# Patient Record
Sex: MALE | Race: WHITE | Employment: OTHER | URBAN - METROPOLITAN AREA
[De-identification: names, ages, dates, MRNs, and addresses within clinical notes are randomized per-mention and may not be internally consistent; named-entity substitution may affect disease eponyms.]

---

## 2023-02-08 ENCOUNTER — HOSPITAL ENCOUNTER (OUTPATIENT)
Dept: CARDIAC CATH/INVASIVE PROCEDURES | Age: 83
Discharge: HOME | End: 2023-02-08
Payer: COMMERCIAL

## 2023-02-08 VITALS — HEIGHT: 72 IN | BODY MASS INDEX: 25.33 KG/M2 | WEIGHT: 187 LBS

## 2023-02-08 PROCEDURE — 93660 TILT TABLE EVALUATION: CPT

## 2023-02-08 NOTE — PROCEDURES
[address]                  [facility]                                TILT TABLE TEST    PATIENT NAME: Zahida Quick                 :        1940  MED REC NO:   [id number]                             ROOM:  ACCOUNT NO:   [de-identified]                           ADMIT DATE: 2023  PROVIDER:     Gi Feliz MD      Cardiovascular Diagnostics Department       PROCEDURE:  Tilt table test.    PCP:  Dr. Rodo Chin. HISTORY AND INDICATIONS:  This is an 81-year-old gentleman with history  of hypertension and preserved ventricular function with no coronary  disease on cardiac catheterization over the past year. He does have  known moderate-to-severe aortic insufficiency with preserved ventricular  function. He has had problems with somewhat labile blood pressures as  well as periods of positional lightheadedness and dizziness. He is  scheduled for ENT evaluation and at this point comes for tilt table  testing. PROCEDURE:  After informed consent was obtained, the patient was brought  to the electrophysiology area in the fasting, unsedated state. He was  placed on the tilt table, connected to the continuous cycling blood  pressure cuff monitor and to the continuous heart rate monitor. After  baseline supine measurements were obtained, the table was placed upright  to 80 degrees for a period of 20 minutes, at which point right and left  carotid sinus massage were performed with the table remaining upright. At this point, 0.4 mg sublingual nitroglycerin was given and the table  was kept upright for an additional 10 minutes. At the end of the case,  the table was placed supine. Testing was completed. Findings were  discussed in detail with the patient and his wife. Instruction and  prescription were given and he was discharged from the area in good  condition. FINDINGS:  1.   At 0 degree supine, blood pressure was 154/69, heart rate was 68 in  atrial fibrillation. 2.  At 2 minutes upright, blood pressure was 150/71, heart rate was 58. The patient was not symptomatic. 3.  At 20 minutes upright, blood pressure was 154/64, heart rate was 50  in atrial fibrillation. The patient was not symptomatic. 4.  With right carotid sinus massage upright at 20 minutes, blood  pressure fell to 127/77 mmHg, heart rate was stable at 54 beats per  minute with no pauses seen. 5.  With left carotid sinus massage upright at 20 minutes, blood  pressure was 135/66, heart rate was 52. The patient was mildly  lightheaded. There were no pauses. 6.  0.4 mg sublingual nitroglycerin was given at 20 minutes upright. 7.  At 24 minutes upright, blood pressure fell to 122/68 mmHg, heart  rate was 63 beats per minute. The patient was mildly lightheaded. IMPRESSION:  1. Vasodepressor responses to carotid sinus massage as well as  nitroglycerin consistent with vasovagal syndrome. 2.  No bradycardia or pauses with carotid sinus massage. PLAN:  1. Continue good daily fluid intake using 32 ounces of Gatorade daily. 2.  Continue current antihypertensive regimen. 3.  Add fludrocortisone 0.1 mg every Monday, Wednesday, and Friday. 4.  Continue use of thigh-high support stockings daily. 5.  Plan for close followup in early March at [facility]. Jhon Crockett MD    D: 02/08/2023 12:08:04       T: 02/08/2023 12:10:40     [email or web address]  Job#: [id number]     Doc#: [id number]    CC:  MD Salma Roth.  Erick Hernandez

## 2025-01-10 ENCOUNTER — TELEPHONE (OUTPATIENT)
Dept: CARDIOLOGY CLINIC | Age: 85
End: 2025-01-10

## 2025-01-10 PROBLEM — I25.3 ATRIAL SEPTAL ANEURYSM: Status: ACTIVE | Noted: 2025-01-10

## 2025-01-10 NOTE — TELEPHONE ENCOUNTER
Dr. Katy Smith stated he would like to perform a R/L heart cath and a CHRIS on this pt.    Thank you.   Roseanna PARK CNP

## 2025-02-05 ENCOUNTER — HOSPITAL ENCOUNTER (INPATIENT)
Age: 85
LOS: 1 days | Discharge: HOME | End: 2025-02-06
Attending: INTERNAL MEDICINE | Admitting: INTERNAL MEDICINE
Payer: COMMERCIAL

## 2025-02-05 ENCOUNTER — APPOINTMENT (OUTPATIENT)
Age: 85
End: 2025-02-05
Attending: INTERNAL MEDICINE
Payer: COMMERCIAL

## 2025-02-05 DIAGNOSIS — I35.0 NONRHEUMATIC AORTIC VALVE STENOSIS: Primary | ICD-10-CM

## 2025-02-05 DIAGNOSIS — I35.0 AORTIC STENOSIS: ICD-10-CM

## 2025-02-05 LAB
BUN BLD-MCNC: 38 MG/DL (ref 8–26)
CHLORIDE BLD-SCNC: 106 MMOL/L (ref 98–107)
ECHO BSA: 1.98 M2
ECHO LV EF PHYSICIAN: 50 %
EGFR, POC: 75 ML/MIN/1.73M2
GLUCOSE BLD-MCNC: 96 MG/DL (ref 74–100)
HCT VFR BLD AUTO: 39 % (ref 41–53)
PLATELET # BLD AUTO: 210 K/UL (ref 138–453)
POC CREATININE: 1 MG/DL (ref 0.51–1.19)
POC HEMOGLOBIN (CALC): 13.2 G/DL (ref 13.5–17.5)
POTASSIUM BLD-SCNC: 4.6 MMOL/L (ref 3.5–4.5)
SODIUM BLD-SCNC: 142 MMOL/L (ref 138–146)

## 2025-02-05 PROCEDURE — C1892 INTRO/SHEATH,FIXED,PEEL-AWAY: HCPCS | Performed by: INTERNAL MEDICINE

## 2025-02-05 PROCEDURE — 99152 MOD SED SAME PHYS/QHP 5/>YRS: CPT | Performed by: INTERNAL MEDICINE

## 2025-02-05 PROCEDURE — 6370000000 HC RX 637 (ALT 250 FOR IP): Performed by: INTERNAL MEDICINE

## 2025-02-05 PROCEDURE — 93460 R&L HRT ART/VENTRICLE ANGIO: CPT | Performed by: INTERNAL MEDICINE

## 2025-02-05 PROCEDURE — C1760 CLOSURE DEV, VASC: HCPCS | Performed by: INTERNAL MEDICINE

## 2025-02-05 PROCEDURE — C1894 INTRO/SHEATH, NON-LASER: HCPCS | Performed by: INTERNAL MEDICINE

## 2025-02-05 PROCEDURE — 99153 MOD SED SAME PHYS/QHP EA: CPT | Performed by: INTERNAL MEDICINE

## 2025-02-05 PROCEDURE — 76937 US GUIDE VASCULAR ACCESS: CPT | Performed by: INTERNAL MEDICINE

## 2025-02-05 PROCEDURE — 93454 CORONARY ARTERY ANGIO S&I: CPT | Performed by: INTERNAL MEDICINE

## 2025-02-05 PROCEDURE — 4A023N8 MEASUREMENT OF CARDIAC SAMPLING AND PRESSURE, BILATERAL, PERCUTANEOUS APPROACH: ICD-10-PCS | Performed by: INTERNAL MEDICINE

## 2025-02-05 PROCEDURE — 84132 ASSAY OF SERUM POTASSIUM: CPT

## 2025-02-05 PROCEDURE — 84520 ASSAY OF UREA NITROGEN: CPT

## 2025-02-05 PROCEDURE — 93312 ECHO TRANSESOPHAGEAL: CPT | Performed by: INTERNAL MEDICINE

## 2025-02-05 PROCEDURE — 93451 RIGHT HEART CATH: CPT | Performed by: INTERNAL MEDICINE

## 2025-02-05 PROCEDURE — 82947 ASSAY GLUCOSE BLOOD QUANT: CPT

## 2025-02-05 PROCEDURE — 6370000000 HC RX 637 (ALT 250 FOR IP): Performed by: STUDENT IN AN ORGANIZED HEALTH CARE EDUCATION/TRAINING PROGRAM

## 2025-02-05 PROCEDURE — 84295 ASSAY OF SERUM SODIUM: CPT

## 2025-02-05 PROCEDURE — C1887 CATHETER, GUIDING: HCPCS | Performed by: INTERNAL MEDICINE

## 2025-02-05 PROCEDURE — 2500000003 HC RX 250 WO HCPCS: Performed by: STUDENT IN AN ORGANIZED HEALTH CARE EDUCATION/TRAINING PROGRAM

## 2025-02-05 PROCEDURE — B246ZZ4 ULTRASONOGRAPHY OF RIGHT AND LEFT HEART, TRANSESOPHAGEAL: ICD-10-PCS | Performed by: INTERNAL MEDICINE

## 2025-02-05 PROCEDURE — C1769 GUIDE WIRE: HCPCS | Performed by: INTERNAL MEDICINE

## 2025-02-05 PROCEDURE — 85049 AUTOMATED PLATELET COUNT: CPT

## 2025-02-05 PROCEDURE — 85014 HEMATOCRIT: CPT

## 2025-02-05 PROCEDURE — 82565 ASSAY OF CREATININE: CPT

## 2025-02-05 PROCEDURE — 2709999900 HC NON-CHARGEABLE SUPPLY: Performed by: INTERNAL MEDICINE

## 2025-02-05 PROCEDURE — C1725 CATH, TRANSLUMIN NON-LASER: HCPCS | Performed by: INTERNAL MEDICINE

## 2025-02-05 PROCEDURE — B2111ZZ FLUOROSCOPY OF MULTIPLE CORONARY ARTERIES USING LOW OSMOLAR CONTRAST: ICD-10-PCS | Performed by: INTERNAL MEDICINE

## 2025-02-05 PROCEDURE — 93312 ECHO TRANSESOPHAGEAL: CPT

## 2025-02-05 PROCEDURE — 82435 ASSAY OF BLOOD CHLORIDE: CPT

## 2025-02-05 PROCEDURE — 7100000010 HC PHASE II RECOVERY - FIRST 15 MIN: Performed by: INTERNAL MEDICINE

## 2025-02-05 PROCEDURE — 7100000011 HC PHASE II RECOVERY - ADDTL 15 MIN: Performed by: INTERNAL MEDICINE

## 2025-02-05 PROCEDURE — 6360000002 HC RX W HCPCS: Performed by: INTERNAL MEDICINE

## 2025-02-05 PROCEDURE — 2060000000 HC ICU INTERMEDIATE R&B

## 2025-02-05 PROCEDURE — 6360000004 HC RX CONTRAST MEDICATION: Performed by: INTERNAL MEDICINE

## 2025-02-05 PROCEDURE — 93325 DOPPLER ECHO COLOR FLOW MAPG: CPT | Performed by: INTERNAL MEDICINE

## 2025-02-05 RX ORDER — METOPROLOL SUCCINATE 25 MG/1
12.5 TABLET, EXTENDED RELEASE ORAL NIGHTLY
Status: DISCONTINUED | OUTPATIENT
Start: 2025-02-05 | End: 2025-02-06 | Stop reason: HOSPADM

## 2025-02-05 RX ORDER — TAMSULOSIN HYDROCHLORIDE 0.4 MG/1
0.4 CAPSULE ORAL DAILY
Status: DISCONTINUED | OUTPATIENT
Start: 2025-02-05 | End: 2025-02-06 | Stop reason: HOSPADM

## 2025-02-05 RX ORDER — POTASSIUM CHLORIDE 1500 MG/1
40 TABLET, EXTENDED RELEASE ORAL PRN
Status: DISCONTINUED | OUTPATIENT
Start: 2025-02-05 | End: 2025-02-06 | Stop reason: HOSPADM

## 2025-02-05 RX ORDER — OXYCODONE AND ACETAMINOPHEN 5; 325 MG/1; MG/1
1 TABLET ORAL ONCE
Status: COMPLETED | OUTPATIENT
Start: 2025-02-05 | End: 2025-02-05

## 2025-02-05 RX ORDER — SODIUM CHLORIDE 9 MG/ML
INJECTION, SOLUTION INTRAVENOUS PRN
Status: DISCONTINUED | OUTPATIENT
Start: 2025-02-05 | End: 2025-02-06 | Stop reason: HOSPADM

## 2025-02-05 RX ORDER — SODIUM CHLORIDE 0.9 % (FLUSH) 0.9 %
5-40 SYRINGE (ML) INJECTION EVERY 12 HOURS SCHEDULED
Status: DISCONTINUED | OUTPATIENT
Start: 2025-02-05 | End: 2025-02-06 | Stop reason: HOSPADM

## 2025-02-05 RX ORDER — POLYETHYLENE GLYCOL 3350 17 G/17G
17 POWDER, FOR SOLUTION ORAL DAILY PRN
Status: DISCONTINUED | OUTPATIENT
Start: 2025-02-05 | End: 2025-02-06 | Stop reason: HOSPADM

## 2025-02-05 RX ORDER — TIMOLOL MALEATE 2.5 MG/ML
1 SOLUTION/ DROPS OPHTHALMIC DAILY
Status: DISCONTINUED | OUTPATIENT
Start: 2025-02-05 | End: 2025-02-06 | Stop reason: HOSPADM

## 2025-02-05 RX ORDER — ACETAMINOPHEN 650 MG/1
650 SUPPOSITORY RECTAL EVERY 6 HOURS PRN
Status: DISCONTINUED | OUTPATIENT
Start: 2025-02-05 | End: 2025-02-06 | Stop reason: HOSPADM

## 2025-02-05 RX ORDER — SODIUM CHLORIDE 0.9 % (FLUSH) 0.9 %
5-40 SYRINGE (ML) INJECTION PRN
Status: DISCONTINUED | OUTPATIENT
Start: 2025-02-05 | End: 2025-02-06 | Stop reason: HOSPADM

## 2025-02-05 RX ORDER — MIDAZOLAM HYDROCHLORIDE 1 MG/ML
INJECTION, SOLUTION INTRAMUSCULAR; INTRAVENOUS PRN
Status: DISCONTINUED | OUTPATIENT
Start: 2025-02-05 | End: 2025-02-05 | Stop reason: HOSPADM

## 2025-02-05 RX ORDER — IOPAMIDOL 755 MG/ML
INJECTION, SOLUTION INTRAVASCULAR PRN
Status: DISCONTINUED | OUTPATIENT
Start: 2025-02-05 | End: 2025-02-05 | Stop reason: HOSPADM

## 2025-02-05 RX ORDER — LOSARTAN POTASSIUM 50 MG/1
100 TABLET ORAL DAILY
Status: DISCONTINUED | OUTPATIENT
Start: 2025-02-06 | End: 2025-02-06 | Stop reason: HOSPADM

## 2025-02-05 RX ORDER — ONDANSETRON 4 MG/1
4 TABLET, ORALLY DISINTEGRATING ORAL EVERY 8 HOURS PRN
Status: DISCONTINUED | OUTPATIENT
Start: 2025-02-05 | End: 2025-02-06 | Stop reason: HOSPADM

## 2025-02-05 RX ORDER — SODIUM CHLORIDE 9 MG/ML
INJECTION, SOLUTION INTRAVENOUS CONTINUOUS
Status: DISCONTINUED | OUTPATIENT
Start: 2025-02-05 | End: 2025-02-05 | Stop reason: HOSPADM

## 2025-02-05 RX ORDER — POTASSIUM CHLORIDE 7.45 MG/ML
10 INJECTION INTRAVENOUS PRN
Status: DISCONTINUED | OUTPATIENT
Start: 2025-02-05 | End: 2025-02-06 | Stop reason: HOSPADM

## 2025-02-05 RX ORDER — MAGNESIUM SULFATE IN WATER 40 MG/ML
2000 INJECTION, SOLUTION INTRAVENOUS PRN
Status: DISCONTINUED | OUTPATIENT
Start: 2025-02-05 | End: 2025-02-06 | Stop reason: HOSPADM

## 2025-02-05 RX ORDER — AMLODIPINE BESYLATE 5 MG/1
5 TABLET ORAL DAILY
Status: DISCONTINUED | OUTPATIENT
Start: 2025-02-05 | End: 2025-02-06 | Stop reason: HOSPADM

## 2025-02-05 RX ORDER — SPIRONOLACTONE 25 MG/1
12.5 TABLET ORAL DAILY
Status: DISCONTINUED | OUTPATIENT
Start: 2025-02-06 | End: 2025-02-06 | Stop reason: HOSPADM

## 2025-02-05 RX ORDER — LIDOCAINE HYDROCHLORIDE 20 MG/ML
SOLUTION OROPHARYNGEAL PRN
Status: DISCONTINUED | OUTPATIENT
Start: 2025-02-05 | End: 2025-02-05 | Stop reason: HOSPADM

## 2025-02-05 RX ORDER — ONDANSETRON 2 MG/ML
4 INJECTION INTRAMUSCULAR; INTRAVENOUS EVERY 6 HOURS PRN
Status: DISCONTINUED | OUTPATIENT
Start: 2025-02-05 | End: 2025-02-06 | Stop reason: HOSPADM

## 2025-02-05 RX ORDER — ATORVASTATIN CALCIUM 10 MG/1
10 TABLET, FILM COATED ORAL DAILY
Status: DISCONTINUED | OUTPATIENT
Start: 2025-02-05 | End: 2025-02-06 | Stop reason: HOSPADM

## 2025-02-05 RX ORDER — FENTANYL CITRATE 50 UG/ML
INJECTION, SOLUTION INTRAMUSCULAR; INTRAVENOUS PRN
Status: DISCONTINUED | OUTPATIENT
Start: 2025-02-05 | End: 2025-02-05 | Stop reason: HOSPADM

## 2025-02-05 RX ORDER — FUROSEMIDE 40 MG/1
40 TABLET ORAL DAILY
Status: DISCONTINUED | OUTPATIENT
Start: 2025-02-06 | End: 2025-02-06 | Stop reason: HOSPADM

## 2025-02-05 RX ORDER — ACETAMINOPHEN 325 MG/1
650 TABLET ORAL EVERY 6 HOURS PRN
Status: DISCONTINUED | OUTPATIENT
Start: 2025-02-05 | End: 2025-02-06 | Stop reason: HOSPADM

## 2025-02-05 RX ADMIN — SODIUM CHLORIDE, PRESERVATIVE FREE 10 ML: 5 INJECTION INTRAVENOUS at 20:09

## 2025-02-05 RX ADMIN — METOPROLOL SUCCINATE 12.5 MG: 25 TABLET, EXTENDED RELEASE ORAL at 20:08

## 2025-02-05 RX ADMIN — OXYCODONE HYDROCHLORIDE AND ACETAMINOPHEN 1 TABLET: 5; 325 TABLET ORAL at 16:41

## 2025-02-05 ASSESSMENT — PAIN DESCRIPTION - LOCATION: LOCATION: SHOULDER

## 2025-02-05 ASSESSMENT — PAIN DESCRIPTION - ORIENTATION: ORIENTATION: RIGHT

## 2025-02-05 ASSESSMENT — PAIN SCALES - GENERAL: PAINLEVEL_OUTOF10: 7

## 2025-02-05 ASSESSMENT — PAIN DESCRIPTION - DESCRIPTORS: DESCRIPTORS: ACHING

## 2025-02-05 NOTE — PROGRESS NOTES
Patient admitted, consent signed and questions answered. Patient ready for procedure. Call light to reach with side rails up 2 of 2. Bilateral groins clipped with writer and Rachell present.  Family at bedside with patient.  History and physical needed.

## 2025-02-05 NOTE — PROGRESS NOTES
Patient returned to PCC room 7 post  procedure.  Assessment & VS obtained. Restrictions/Education reviewed with family and patient. Post procedure pathway initiated. Pt without complications.  Groin site soft, dry & no hematoma noted. Wife and daughter at bedside. Supine position with BLE straight. Pulses obtained and documented. Patient incontinent of large amount of loose stool. Patient cleansed and brief applied. Will continue to monitor.

## 2025-02-05 NOTE — H&P
[facility]  Procedure History and Physical Update          Patient Name: Grupo Pérez  MRN:    [id number]  YOB: 1940  Date of evaluation:  2/5/2025    Procedure:    R/L cath     Indication for procedure:  AS      Please refer to the office note completed by Dr. Bronson on 1/6/25 in the medical record and note that:    [x] I have examined the patient and reviewed the H&P/Consult and there are no changes to be made to the assessment or plan.    [] I have examined the patient and reviewed the H&P/Consult and have noted the following changes:    Past Medical History:   Diagnosis Date    Arthritis     Blood circulation, collateral     CAD (coronary artery disease)     Hyperlipidemia     Hypertension        Past Surgical History:   Procedure Laterality Date    COLONOSCOPY      ENDOSCOPY, COLON, DIAGNOSTIC      HERNIA REPAIR      JOINT REPLACEMENT      TONSILLECTOMY         Family History   Problem Relation Age of Onset    High Blood Pressure Mother     Cancer Father     Other Brother        No Known Allergies    Prior to Admission medications    Medication Sig Start Date End Date Taking? Authorizing Provider   losartan (COZAAR) 100 MG tablet Take 1 tablet by mouth daily 1/6/25   Carmen Allen APRN - CNP   amLODIPine (NORVASC) 5 MG tablet Take 1 tablet by mouth daily 10/7/24   Erendira Lam MD   metoprolol succinate (TOPROL XL) 25 MG extended release tablet Take 0.5 tablets by mouth nightly 10/7/24 10/7/25  Erendira Lam MD   furosemide (LASIX) 40 MG tablet Take 1 tablet by mouth daily 4/30/24   Erendira Lam MD   spironolactone (ALDACTONE) 25 MG tablet Take 0.5 tablets by mouth daily 4/30/24   Erendira Lam MD   apixaban (ELIQUIS) 5 MG TABS tablet Take 1 tablet by mouth 2 times daily 4/25/24   Bowen Antoine MD   tamsulosin (FLOMAX) 0.4 MG capsule Take 1 capsule by mouth 10/18/23   ProviderMarisa MD   simvastatin (ZOCOR) 20 MG tablet take 1 tablet by mouth  I would like to repeat a CHRIS/Right and left heart cath and coronary angiography to further evaluate his aortic valve status and coronary status.  I did discuss with him benefits risks and alternatives of TAVR including the risk of bleeding stroke heart attack contrast allergy nephropathy need for permanent pacemaker need for emergency vascular or cardiac surgery and death.  They agree to proceed with the procedure.     Plan:  Proceed with planned procedure.  Further orders to follow.      Risks, benefits, and alternatives of cardiac catheterization were discussed, in detail, with patient. Risks include, but not limited to, bleeding, requiring blood transfusion, vascular complication requiring surgery, renal failure with need of dialysis, CVA, MI, death and anesthesia complications including intubation were discussed. Patient verbalized understanding and agreed to proceed with the procedure understanding the above risks and alternatives to the procedure.    Transesophageal echocardiography (procedure) has been fully reviewed with the patient and written informed consent has been obtained. The risks, benefits, and alternatives were discussed, in detail, with patient.     Briefly, the potential risks include, but are not limited to, bleeding, damage to teeth or pharynx, esophageal damage or rupture, chamber perforation, tamponade, respiratory failure requiring intubation, need for emergent surgery, and even death. All questions and concerns were answered. Patient agreed to proceed with the procedure understanding the above risks and alternatives to the procedure.     Risks, benefits, alternatives, and details discussed extensively. Accepts and consents.    Pre Procedure Conscious Sedation Data:     ASA Class:                  [] I [x] II [] III [] IV     Mallampati Class:       [] I [x] II [] III [] IV         Electronically signed on 02/05/25 at 8:43 AM by:    MARINA Ortiz MD, MD   Fellow, Cardiovascular

## 2025-02-05 NOTE — PLAN OF CARE
Problem: Discharge Planning  Goal: Discharge to home or other facility with appropriate resources  Outcome: Progressing     Problem: Safety - Adult  Goal: Free from fall injury  Outcome: Progressing     Problem: ABCDS Injury Assessment  Goal: Absence of physical injury  Outcome: Progressing     Problem: Cardiovascular - Adult  Goal: Maintains optimal cardiac output and hemodynamic stability  Outcome: Progressing     Problem: Skin/Tissue Integrity - Adult  Goal: Skin integrity remains intact  Outcome: Progressing     Problem: Musculoskeletal - Adult  Goal: Return mobility to safest level of function  Outcome: Progressing     Problem: Genitourinary - Adult  Goal: Absence of urinary retention  Outcome: Progressing     Problem: Behavior  Goal: Pt/Family maintain appropriate behavior and adhere to behavioral management agreement, if implemented  Description: INTERVENTIONS:  1. Assess patient/family's coping skills and  non-compliant behavior (including use of illegal substances)  2. Notify security of behavior or suspected illegal substances which indicate the need for search of the family and/or belongings  3. Encourage verbalization of thoughts and concerns in a socially appropriate manner  4. Utilize positive, consistent limit setting strategies supporting safety of patient, staff and others  5. Encourage participation in the decision making process about the behavioral management agreement  6. If a visitor's behavior poses a threat to safety call refer to organization policy.  7. Initiate consult with , Psychosocial CNS, Spiritual Care as appropriate  Outcome: Progressing

## 2025-02-06 ENCOUNTER — TELEPHONE (OUTPATIENT)
Dept: CARDIOLOGY CLINIC | Age: 85
End: 2025-02-06

## 2025-02-06 VITALS
HEIGHT: 72 IN | TEMPERATURE: 98 F | RESPIRATION RATE: 11 BRPM | DIASTOLIC BLOOD PRESSURE: 40 MMHG | WEIGHT: 170 LBS | BODY MASS INDEX: 23.03 KG/M2 | SYSTOLIC BLOOD PRESSURE: 97 MMHG | OXYGEN SATURATION: 96 % | HEART RATE: 44 BPM

## 2025-02-06 DIAGNOSIS — I35.0 AORTIC STENOSIS, SEVERE: Primary | ICD-10-CM

## 2025-02-06 DIAGNOSIS — R06.02 SHORTNESS OF BREATH: ICD-10-CM

## 2025-02-06 DIAGNOSIS — R42 DIZZINESS AND GIDDINESS: ICD-10-CM

## 2025-02-06 PROCEDURE — 6370000000 HC RX 637 (ALT 250 FOR IP): Performed by: STUDENT IN AN ORGANIZED HEALTH CARE EDUCATION/TRAINING PROGRAM

## 2025-02-06 PROCEDURE — 2500000003 HC RX 250 WO HCPCS: Performed by: STUDENT IN AN ORGANIZED HEALTH CARE EDUCATION/TRAINING PROGRAM

## 2025-02-06 PROCEDURE — 99233 SBSQ HOSP IP/OBS HIGH 50: CPT | Performed by: NURSE PRACTITIONER

## 2025-02-06 RX ADMIN — APIXABAN 5 MG: 2.5 TABLET, FILM COATED ORAL at 08:13

## 2025-02-06 RX ADMIN — LOSARTAN POTASSIUM 100 MG: 50 TABLET, FILM COATED ORAL at 08:13

## 2025-02-06 RX ADMIN — TIMOLOL MALEATE 1 DROP: 2.5 SOLUTION OPHTHALMIC at 10:28

## 2025-02-06 RX ADMIN — ACETAMINOPHEN 650 MG: 325 TABLET ORAL at 08:13

## 2025-02-06 RX ADMIN — FUROSEMIDE 40 MG: 40 TABLET ORAL at 08:13

## 2025-02-06 RX ADMIN — SPIRONOLACTONE 12.5 MG: 25 TABLET, FILM COATED ORAL at 08:13

## 2025-02-06 RX ADMIN — ATORVASTATIN CALCIUM 10 MG: 10 TABLET, FILM COATED ORAL at 08:14

## 2025-02-06 RX ADMIN — AMLODIPINE BESYLATE 5 MG: 5 TABLET ORAL at 08:14

## 2025-02-06 RX ADMIN — TAMSULOSIN HYDROCHLORIDE 0.4 MG: 0.4 CAPSULE ORAL at 08:13

## 2025-02-06 RX ADMIN — SODIUM CHLORIDE, PRESERVATIVE FREE 10 ML: 5 INJECTION INTRAVENOUS at 08:14

## 2025-02-06 ASSESSMENT — PAIN DESCRIPTION - LOCATION: LOCATION: SHOULDER

## 2025-02-06 ASSESSMENT — PAIN SCALES - GENERAL: PAINLEVEL_OUTOF10: 2

## 2025-02-06 ASSESSMENT — PAIN DESCRIPTION - ORIENTATION: ORIENTATION: RIGHT

## 2025-02-06 NOTE — PLAN OF CARE
Problem: Discharge Planning  Goal: Discharge to home or other facility with appropriate resources  2/6/2025 0242 by Roman Reyna RN  Outcome: Progressing  2/5/2025 1854 by Lilia Salmon RN  Outcome: Progressing     Problem: Safety - Adult  Goal: Free from fall injury  2/6/2025 0242 by Roman Reyna RN  Outcome: Progressing  2/5/2025 1854 by Lilia Salmon RN  Outcome: Progressing     Problem: ABCDS Injury Assessment  Goal: Absence of physical injury  2/6/2025 0242 by Roman Reyna RN  Outcome: Progressing  2/5/2025 1854 by Lilia Salmon RN  Outcome: Progressing     Problem: Cardiovascular - Adult  Goal: Maintains optimal cardiac output and hemodynamic stability  2/6/2025 0242 by Roman Reyna RN  Outcome: Progressing  2/5/2025 1854 by Lilia Salmon RN  Outcome: Progressing     Problem: Skin/Tissue Integrity - Adult  Goal: Skin integrity remains intact  2/6/2025 0242 by Roman Reyna RN  Outcome: Progressing  2/5/2025 1854 by Lilia Salmon RN  Outcome: Progressing     Problem: Musculoskeletal - Adult  Goal: Return mobility to safest level of function  2/6/2025 0242 by Roman Reyna RN  Outcome: Progressing  2/5/2025 1854 by Lilia Salmon RN  Outcome: Progressing     Problem: Genitourinary - Adult  Goal: Absence of urinary retention  2/6/2025 0242 by Roman Reyna RN  Outcome: Progressing  2/5/2025 1854 by Lilia Salmon RN  Outcome: Progressing     Problem: Behavior  Goal: Pt/Family maintain appropriate behavior and adhere to behavioral management agreement, if implemented  Description: INTERVENTIONS:  1. Assess patient/family's coping skills and  non-compliant behavior (including use of illegal substances)  2. Notify security of behavior or suspected illegal substances which indicate the need for search of the family and/or belongings  3. Encourage verbalization of thoughts and concerns in a socially appropriate manner  4. Utilize positive, consistent

## 2025-02-06 NOTE — PLAN OF CARE
Problem: Discharge Planning  Goal: Discharge to home or other facility with appropriate resources  Outcome: Adequate for Discharge     Problem: Safety - Adult  Goal: Free from fall injury  Outcome: Adequate for Discharge     Problem: ABCDS Injury Assessment  Goal: Absence of physical injury  Outcome: Adequate for Discharge     Problem: Cardiovascular - Adult  Goal: Maintains optimal cardiac output and hemodynamic stability  Outcome: Adequate for Discharge     Problem: Skin/Tissue Integrity - Adult  Goal: Skin integrity remains intact  Outcome: Adequate for Discharge     Problem: Musculoskeletal - Adult  Goal: Return mobility to safest level of function  Outcome: Adequate for Discharge     Problem: Genitourinary - Adult  Goal: Absence of urinary retention  Outcome: Adequate for Discharge     Problem: Behavior  Goal: Pt/Family maintain appropriate behavior and adhere to behavioral management agreement, if implemented  Description: INTERVENTIONS:  1. Assess patient/family's coping skills and  non-compliant behavior (including use of illegal substances)  2. Notify security of behavior or suspected illegal substances which indicate the need for search of the family and/or belongings  3. Encourage verbalization of thoughts and concerns in a socially appropriate manner  4. Utilize positive, consistent limit setting strategies supporting safety of patient, staff and others  5. Encourage participation in the decision making process about the behavioral management agreement  6. If a visitor's behavior poses a threat to safety call refer to organization policy.  7. Initiate consult with , Psychosocial CNS, Spiritual Care as appropriate  Outcome: Adequate for Discharge

## 2025-02-06 NOTE — TELEPHONE ENCOUNTER
Writer spoke with Mendy Marcus, [facility], and she states pt does not need a CTS appt to eval severe AS, as [facility] replied that no prior auth is needed if pt receives TAVR procedure in plan, and [facility] is part of their network. Faxed transmission from [facility] to be scanned into [employer].   Pt will be worked up as an OP for TAVR. Pt  has already seen CT surgeons in the [facility] system but their notes do not state TAVR is preferable to SAVR, so their notes will not  be used for insurance purposes.     Roseanna PARK CNP  [facility]

## 2025-02-06 NOTE — PROGRESS NOTES
Writer discussed pt with Dr. Burns and he is agreeable to pt discharging today. Carmen CABRERA updated. Pt will be worked up for TAVR as an outpatient.   Brooklyn Alfonso RN, [facility]  introduced [facility] to patient. Writer's business card  and TAVR booklet left with patient. All questions answered to patient's satisfaction.       Cardiac Catheterization   2/5/25          Roseanna PARK CNP  [facility]

## 2025-02-06 NOTE — DISCHARGE SUMMARY
[facility]  Discharge Note                 Name:  Grupo Pérez  YOB: 1940  Social Security Number:  [id number]  Medical Record Number:  [id number]    Date of Admission:  2/5/2025  Date of Discharge:  2/6/2025    Admitting physician: Vidhi Burns MD    Discharge Attending: XAVIER BLANDON - CNP, CNP  Primary Care Physician: Roger Mcmullen MD  Consultants: Cardiology  Discharge to Home  Stable Condition   HOSPITAL ADMISSION PROBLEM LIST:  Patient Active Problem List   Diagnosis    Popliteal artery embolism, right (HCC)    HTN (hypertension)    Dyslipidemia    Chronic a-fib (HCC)    OA (osteoarthritis)    Atrial septal aneurysm    Nonrheumatic aortic valve stenosis    Aortic stenosis, severe         Procedures:cardiac catheterization, CHRIS     HOSPITAL COURSE :           The patient was admitted for: Cath and CHRIS   Hospital Procedures if any:  Cath and CHRIS   Medications changes recommendation: see list   Follow Up Plan: 2 weeks       Discharge exam:   Vitals:    02/06/25 1130   BP: (!) 106/53   Pulse: 54   Resp: 19   Temp:    SpO2:      Neuro: normal  Chest: Clear to ausculation. No wheezing.  Cardiac: Regular rate. s1 and s2 auscultated. No murmur noted.  Abdomen/groin: soft, non-tender, without masses or organomegaly  Lower extremity edema: none     Follow up with primary care provider 1 week  Follow up with cardiology 4 weeks  Follow up with other consultant physicians at their directions.    Discharge Medications:     Medication List        CONTINUE taking these medications      amLODIPine 5 MG tablet  Commonly known as: NORVASC  Take 1 tablet by mouth daily     apixaban 5 MG Tabs tablet  Commonly known as: ELIQUIS  Take 1 tablet by mouth 2 times daily     furosemide 40 MG tablet  Commonly known as: LASIX  Take 1 tablet by mouth daily     losartan 100 MG tablet  Commonly known as: COZAAR  Take 1 tablet by mouth daily     metoprolol succinate 25 MG extended  instructions reviewed with patient and nursing. Pt verbalizes understanding regarding medications and activity restrictions.    Will follow up in 2 weeks to discuss TAVR.      Electronically signed by XAVIER BLANDON CNP on 2/6/2025 at 1:08 PM  [facility]      [phone]

## 2025-02-06 NOTE — CARE COORDINATION
Case Management Assessment  Initial Evaluation    Date/Time of Evaluation: 2/6/2025 9:24AM  Assessment Completed by: Rekha Esparza    If patient is discharged prior to next notation, then this note serves as note for discharge by case management.    Patient Name: Grupo Pérez                   YOB: 1940  Diagnosis: Aortic stenosis [I35.0]  Aortic stenosis, severe [I35.0]                   Date / Time: 2/5/2025  9:25 AM    Patient Admission Status: Inpatient   Readmission Risk (Low < 19, Mod (19-27), High > 27): Readmission Risk Score: 8.6    Current PCP: Roger Mcmullen MD  PCP verified by CM? Yes (Dr. Mcmullen)    Chart Reviewed: Yes      History Provided by: Patient  Patient Orientation: Alert and Oriented, Person, Place, Situation    Patient Cognition: Alert    Hospitalization in the last 30 days (Readmission):  No    If yes, Readmission Assessment in  Navigator will be completed.    Advance Directives:      Code Status: Full Code   Patient's Primary Decision Maker is: Legal Next of Kin      Discharge Planning:    Patient lives with: Spouse/Significant Other Type of Home: House  Primary Care Giver: Self  Patient Support Systems include: Spouse/Significant Other   Current Financial resources: Medicare  Current community resources: None  Current services prior to admission: Durable Medical Equipment            Current DME: Cane, Walker, Crutches            Type of Home Care services:  None    ADLS  Prior functional level: Independent in ADLs/IADLs  Current functional level: Independent in ADLs/IADLs    PT AM-PAC:   /24  OT AM-PAC:   /24    Family can provide assistance at DC: Yes  Would you like Case Management to discuss the discharge plan with any other family members/significant others, and if so, who? Yes (Wife- Oksana)  Plans to Return to Present Housing: Yes  Other Identified Issues/Barriers to RETURNING to current housing: N/A   Potential Assistance needed at discharge: N/A

## 2025-02-06 NOTE — ADDENDUM NOTE
Addended by: CLAUDIA MEANS on: 2/6/2025 11:19 AM     Modules accepted: Orders     Anesthesia Post-op Note      Patient: Анна Card      Vital Last Value   Temperature 36.3 °C (97.4 °F) (02/15/23 0822)   Pulse 63 (02/15/23 0950)   Respiratory 14 (02/15/23 0950)   Non-Invasive  Blood Pressure 96/54 (02/15/23 0950)   Arterial   Blood Pressure     Pulse Oximetry 100 % (02/15/23 0950)     Mental status recovered: patient participates in evaluation.  VS noted and are stable.  Respiratory function satisfactory; airway patent.  Cardiovascular function and hydration status satisfactory.  Adequate pain control.  Nausea and vomiting control satisfactory.  No apparent anesthetic complications.  Final Anesthesia Post-op Assessment    Patient: Анна Card  Procedure(s) Performed: DILATION AND CURETTAGE, UTERUS RETAINED PRODUCTS OF CONCEPTION  Anesthesia type: MAC    Vitals Value Taken Time   Temp 97.4 02/15/23 1039   Pulse 63 02/15/23 0950   Resp 14 02/15/23 0950   SpO2 100 % 02/15/23 0950   BP 96/54 02/15/23 0950            No notable events documented.

## 2025-02-06 NOTE — PROGRESS NOTES
[facility]   Progress Note                   Date:   2/6/2025  Patient name: Grupo Pérez  Date of admission:  2/5/2025  9:25 AM  MRN:   [id number]  YOB: 1940  PCP: Roger Mcmullen MD    Reason for Admission: Aortic stenosis [I35.0]  Aortic stenosis, severe [I35.0]    Subjective:       Clinical Changes / Abnormalities: Pt seen and examined in the room.  Pt denies any CP or sob.  Labs, vitals and tele reviewed-  afib rate 50's with occ PVC.  Pt states that he is not happy about being here and wants to go home       Medications:   Scheduled Meds:   sodium chloride flush  5-40 mL IntraVENous 2 times per day    timolol  1 drop Both Eyes Daily    tamsulosin  0.4 mg Oral Daily    apixaban  5 mg Oral BID    furosemide  40 mg Oral Daily    spironolactone  12.5 mg Oral Daily    amLODIPine  5 mg Oral Daily    metoprolol succinate  12.5 mg Oral Nightly    losartan  100 mg Oral Daily    atorvastatin  10 mg Oral Daily     Continuous Infusions:   sodium chloride       CBC:   Recent Labs     02/05/25  1008        BMP:    Recent Labs     02/05/25  1005   CREATININE 1.0     Hepatic: No results for input(s): \"AST\", \"ALT\", \"BILITOT\", \"ALKPHOS\" in the last 72 hours.    Invalid input(s): \"ALB\"  Troponin: No results for input(s): \"TROPHS\" in the last 72 hours.  BNP: No results for input(s): \"BNP\" in the last 72 hours.  Lipids: No results for input(s): \"CHOL\", \"HDL\" in the last 72 hours.    Invalid input(s): \"LDLCALCU\"  INR: No results for input(s): \"INR\" in the last 72 hours.      MEDICAL HISTORY:   1. Persistent atrial fibrillation, s/p unsuccessful cardioversion in 2009, remains in atrial fibrillation.     2. Hospital admission 07/2013 with a right leg threatening ischemia, underwent emergent thrombectomy by Dr. Suarez with restoration of blood flow. It was thought to be related to atrial fibrillation, thrombus formation and emboli to the right lower leg. His INR was not therapeutic at that

## 2025-02-10 PROBLEM — I35.1 AORTIC INSUFFICIENCY: Status: ACTIVE | Noted: 2025-02-10

## 2025-02-11 ENCOUNTER — FOLLOWUP TELEPHONE ENCOUNTER (OUTPATIENT)
Age: 85
End: 2025-02-11

## 2025-02-11 NOTE — TELEPHONE ENCOUNTER
RN called pt, wife answered, stated she changed her 's work-up day appts to 2/25/25.  There are 2 TAVRs scheduled that day so RN req we change the appt to 2/26/25.  Wife is agreeable.

## 2025-02-20 ENCOUNTER — FOLLOWUP TELEPHONE ENCOUNTER (OUTPATIENT)
Age: 85
End: 2025-02-20

## 2025-02-20 NOTE — TELEPHONE ENCOUNTER
Multiple attempts this week (Tues, Wed, Thurs) to reach pt and or his wife to discuss the work up day and TAVR scheduled on 3/4/25 at 7:30.  Messages left on the answering machine w call back number given.

## 2025-02-25 ENCOUNTER — FOLLOWUP TELEPHONE ENCOUNTER (OUTPATIENT)
Age: 85
End: 2025-02-25

## 2025-02-25 NOTE — TELEPHONE ENCOUNTER
RN called pt's wife for work-up day instructions including place (address given), date, and arrival time, NPO x 2h before        CT scan, no caffeine, no inhalers, no smoking, and to bring all medication/supplement bottles in for TARAN Condon to review. Pt verbalized understanding, however stated she \"will not be lugging around his medication bottles\" but will bring an accurate list.

## 2025-02-26 ENCOUNTER — HOSPITAL ENCOUNTER (OUTPATIENT)
Dept: PULMONOLOGY | Age: 85
Discharge: HOME | End: 2025-02-26
Payer: COMMERCIAL

## 2025-02-26 ENCOUNTER — HOSPITAL ENCOUNTER (OUTPATIENT)
Dept: CARDIOLOGY CLINIC | Age: 85
Discharge: HOME | End: 2025-02-26
Payer: COMMERCIAL

## 2025-02-26 ENCOUNTER — HOSPITAL ENCOUNTER (OUTPATIENT)
Age: 85
Discharge: HOME | End: 2025-02-28
Payer: COMMERCIAL

## 2025-02-26 ENCOUNTER — HOSPITAL ENCOUNTER (OUTPATIENT)
Dept: VASCULAR LAB | Age: 85
Discharge: HOME | End: 2025-02-28
Payer: COMMERCIAL

## 2025-02-26 DIAGNOSIS — I35.0 AORTIC STENOSIS, SEVERE: ICD-10-CM

## 2025-02-26 DIAGNOSIS — I48.20 CHRONIC A-FIB (HCC): Chronic | ICD-10-CM

## 2025-02-26 DIAGNOSIS — I35.0 AORTIC STENOSIS, SEVERE: Primary | ICD-10-CM

## 2025-02-26 DIAGNOSIS — E79.0 ELEVATED URIC ACID IN BLOOD: ICD-10-CM

## 2025-02-26 DIAGNOSIS — Z79.01 ANTICOAGULATED: ICD-10-CM

## 2025-02-26 DIAGNOSIS — R42 DIZZINESS AND GIDDINESS: ICD-10-CM

## 2025-02-26 DIAGNOSIS — I35.1 AORTIC VALVE INSUFFICIENCY, ETIOLOGY OF CARDIAC VALVE DISEASE UNSPECIFIED: ICD-10-CM

## 2025-02-26 DIAGNOSIS — R06.02 SHORTNESS OF BREATH: ICD-10-CM

## 2025-02-26 PROCEDURE — 94060 EVALUATION OF WHEEZING: CPT

## 2025-02-26 PROCEDURE — 6360000004 HC RX CONTRAST MEDICATION: Performed by: NURSE PRACTITIONER

## 2025-02-26 PROCEDURE — 94010 BREATHING CAPACITY TEST: CPT

## 2025-02-26 PROCEDURE — 75572 CT HRT W/3D IMAGE: CPT

## 2025-02-26 PROCEDURE — 94729 DIFFUSING CAPACITY: CPT

## 2025-02-26 PROCEDURE — 99205 OFFICE O/P NEW HI 60 MIN: CPT | Performed by: NURSE PRACTITIONER

## 2025-02-26 PROCEDURE — 94664 DEMO&/EVAL PT USE INHALER: CPT

## 2025-02-26 PROCEDURE — 94726 PLETHYSMOGRAPHY LUNG VOLUMES: CPT

## 2025-02-26 PROCEDURE — 74174 CTA ABD&PLVS W/CONTRAST: CPT

## 2025-02-26 PROCEDURE — 94640 AIRWAY INHALATION TREATMENT: CPT

## 2025-02-26 RX ORDER — IOPAMIDOL 755 MG/ML
100 INJECTION, SOLUTION INTRAVASCULAR
Status: DISCONTINUED | OUTPATIENT
Start: 2025-02-26 | End: 2025-03-01 | Stop reason: HOSPADM

## 2025-02-26 RX ORDER — OXYCODONE AND ACETAMINOPHEN 5; 325 MG/1; MG/1
1 TABLET ORAL EVERY 6 HOURS PRN
COMMUNITY

## 2025-02-26 RX ORDER — MECLIZINE HCL 12.5 MG 12.5 MG/1
12.5 TABLET ORAL 3 TIMES DAILY PRN
COMMUNITY

## 2025-02-26 RX ORDER — IOPAMIDOL 755 MG/ML
80 INJECTION, SOLUTION INTRAVASCULAR
Status: COMPLETED | OUTPATIENT
Start: 2025-02-26 | End: 2025-02-26

## 2025-02-26 RX ORDER — CLOTRIMAZOLE AND BETAMETHASONE DIPROPIONATE 10; .64 MG/G; MG/G
CREAM TOPICAL 2 TIMES DAILY
COMMUNITY

## 2025-02-26 RX ADMIN — IOPAMIDOL 80 ML: 755 INJECTION, SOLUTION INTRAVENOUS at 09:57

## 2025-02-26 ASSESSMENT — ENCOUNTER SYMPTOMS
COUGH: 0
ABDOMINAL PAIN: 0
EYE DISCHARGE: 0
SHORTNESS OF BREATH: 1
BLOOD IN STOOL: 0

## 2025-02-26 NOTE — PROGRESS NOTES
Date:  2025  Time:  12:45 PM    Valve Clinic at [facility]    Office: [phone] Fax: [phone]    Re:  Grupo Pérez   Patient : 1940    + shortness of breath, w/ exertion,  cannot walk. Limits his ADLs significantly ,  improved in the winter, fall was extremely bad.   +   fatigue  +  Edema ,  but feels improved w/ lasix, wearing Jobst hose   Denies chest pain   Denies  palpitations   No orthopnea , nor PND     Has R  torn rotator cuff and cannot  change positions easily.        Review of Systems   Constitutional:  Positive for fatigue. Negative for activity change, chills and fever.   Eyes:  Negative for discharge and visual disturbance.   Respiratory:  Positive for shortness of breath. Negative for cough.    Cardiovascular:  Positive for leg swelling. Negative for chest pain and palpitations.   Gastrointestinal:  Negative for abdominal pain and blood in stool.   Endocrine: Negative for cold intolerance and heat intolerance.   Genitourinary:  Negative for dysuria and flank pain.   Musculoskeletal:  Positive for arthralgias. Negative for joint swelling and myalgias.   Skin:  Negative for pallor and rash.   Neurological:  Negative for dizziness and headaches.   Psychiatric/Behavioral:  Negative for hallucinations and suicidal ideas.         Physical Exam  Vitals and nursing note reviewed.   Constitutional:       Comments:      HENT:      Head: Normocephalic and atraumatic.   Eyes:      General: No scleral icterus.     Conjunctiva/sclera: Conjunctivae normal.   Cardiovascular:      Rate and Rhythm: Normal rate and regular rhythm.      Heart sounds: Normal heart sounds.   Pulmonary:      Effort: Pulmonary effort is normal.      Breath sounds: Normal breath sounds. No wheezing or rales.   Abdominal:      Palpations: Abdomen is soft.   Musculoskeletal:      Cervical back: Normal range of motion.      Right lower leg: Edema present.      Left lower leg: Edema present.

## 2025-02-27 PROBLEM — I77.810 DILATED AORTIC ROOT: Status: ACTIVE | Noted: 2025-02-27

## 2025-02-28 ENCOUNTER — TELEPHONE (OUTPATIENT)
Dept: CARDIOLOGY | Age: 85
End: 2025-02-28

## 2025-02-28 PROBLEM — I73.9 PVD (PERIPHERAL VASCULAR DISEASE): Status: ACTIVE | Noted: 2025-02-28

## 2025-02-28 PROBLEM — I50.22 HEART FAILURE WITH MID-RANGE EJECTION FRACTION (HFMEF) (HCC): Status: ACTIVE | Noted: 2025-02-28

## 2025-02-28 NOTE — TELEPHONE ENCOUNTER
Pt and wife called and reminded to hold Eliquis effective with Sun 3/2 pm dose. Patient verbalizes understanding.     Roseanna PARK CNP

## 2025-03-03 ENCOUNTER — HOSPITAL ENCOUNTER (OUTPATIENT)
Age: 85
Setting detail: SPECIMEN
Discharge: HOME | End: 2025-03-03

## 2025-03-03 ENCOUNTER — PREP FOR PROCEDURE (OUTPATIENT)
Dept: CARDIOLOGY | Age: 85
End: 2025-03-03

## 2025-03-03 DIAGNOSIS — I35.0 AORTIC STENOSIS, SEVERE: ICD-10-CM

## 2025-03-03 DIAGNOSIS — I35.0 AORTIC STENOSIS, SEVERE: Primary | ICD-10-CM

## 2025-03-03 LAB
ANION GAP SERPL CALCULATED.3IONS-SCNC: 10 MMOL/L (ref 9–16)
BASOPHILS # BLD: 0.04 K/UL (ref 0–0.2)
BASOPHILS NFR BLD: 1 % (ref 0–2)
BUN SERPL-MCNC: 61 MG/DL (ref 8–23)
CALCIUM SERPL-MCNC: 9.3 MG/DL (ref 8.6–10.4)
CHLORIDE SERPL-SCNC: 105 MMOL/L (ref 98–107)
CO2 SERPL-SCNC: 25 MMOL/L (ref 20–31)
CREAT SERPL-MCNC: 1.3 MG/DL (ref 0.7–1.2)
EOSINOPHIL # BLD: 0.16 K/UL (ref 0–0.44)
EOSINOPHILS RELATIVE PERCENT: 4 % (ref 1–4)
ERYTHROCYTE [DISTWIDTH] IN BLOOD BY AUTOMATED COUNT: 15.9 % (ref 11.8–14.4)
GFR, ESTIMATED: 55 ML/MIN/1.73M2
GLUCOSE SERPL-MCNC: 99 MG/DL (ref 74–99)
HCT VFR BLD AUTO: 34.4 % (ref 40.7–50.3)
HGB BLD-MCNC: 10.9 G/DL (ref 13–17)
IMM GRANULOCYTES # BLD AUTO: 0 K/UL (ref 0–0.3)
IMM GRANULOCYTES NFR BLD: 0 %
INR PPP: 1.1
LYMPHOCYTES NFR BLD: 0.48 K/UL (ref 1.1–3.7)
LYMPHOCYTES RELATIVE PERCENT: 12 % (ref 24–43)
MAGNESIUM SERPL-MCNC: 2.6 MG/DL (ref 1.6–2.4)
MCH RBC QN AUTO: 30.4 PG (ref 25.2–33.5)
MCHC RBC AUTO-ENTMCNC: 31.7 G/DL (ref 28.4–34.8)
MCV RBC AUTO: 95.8 FL (ref 82.6–102.9)
MONOCYTES NFR BLD: 0.68 K/UL (ref 0.1–1.2)
MONOCYTES NFR BLD: 17 % (ref 3–12)
MORPHOLOGY: ABNORMAL
NEUTROPHILS NFR BLD: 66 % (ref 36–65)
NEUTS SEG NFR BLD: 2.64 K/UL (ref 1.5–8.1)
NRBC BLD-RTO: 0 PER 100 WBC
PLATELET # BLD AUTO: 183 K/UL (ref 138–453)
PMV BLD AUTO: 9 FL (ref 8.1–13.5)
POTASSIUM SERPL-SCNC: 4.8 MMOL/L (ref 3.7–5.3)
PROTHROMBIN TIME: 14.9 SEC (ref 11.7–14.9)
RBC # BLD AUTO: 3.59 M/UL (ref 4.21–5.77)
SODIUM SERPL-SCNC: 140 MMOL/L (ref 136–145)
WBC OTHER # BLD: 4 K/UL (ref 3.5–11.3)

## 2025-03-04 ENCOUNTER — ANESTHESIA (OUTPATIENT)
Dept: OPERATING ROOM | Age: 85
DRG: 267 | End: 2025-03-04
Payer: COMMERCIAL

## 2025-03-04 ENCOUNTER — ANESTHESIA EVENT (OUTPATIENT)
Dept: OPERATING ROOM | Age: 85
DRG: 267 | End: 2025-03-04
Payer: COMMERCIAL

## 2025-03-04 ENCOUNTER — APPOINTMENT (OUTPATIENT)
Age: 85
DRG: 267 | End: 2025-03-04
Attending: INTERNAL MEDICINE
Payer: COMMERCIAL

## 2025-03-04 ENCOUNTER — HOSPITAL ENCOUNTER (INPATIENT)
Age: 85
LOS: 2 days | Discharge: HOME | DRG: 267 | End: 2025-03-06
Attending: INTERNAL MEDICINE | Admitting: INTERNAL MEDICINE
Payer: COMMERCIAL

## 2025-03-04 DIAGNOSIS — Z95.2 S/P TAVR (TRANSCATHETER AORTIC VALVE REPLACEMENT): Primary | ICD-10-CM

## 2025-03-04 DIAGNOSIS — I35.0 AORTIC VALVE STENOSIS, ETIOLOGY OF CARDIAC VALVE DISEASE UNSPECIFIED: ICD-10-CM

## 2025-03-04 LAB
ABO + RH BLD: NORMAL
ACT BLD: 308 SEC (ref 79–149)
ARM BAND NUMBER: NORMAL
BLOOD BANK SAMPLE EXPIRATION: NORMAL
BLOOD GROUP ANTIBODIES SERPL: NEGATIVE
ECHO AV AREA PEAK VELOCITY: 1 CM2
ECHO AV AREA VTI: 1 CM2
ECHO AV MEAN GRADIENT: 32 MMHG
ECHO AV MEAN VELOCITY: 2.6 M/S
ECHO AV PEAK GRADIENT: 62 MMHG
ECHO AV PEAK VELOCITY: 4 M/S
ECHO AV VTI: 92 CM
ECHO BSA: 2.02 M2
ECHO LVOT AREA: 4.2 CM2
ECHO LVOT DIAM: 2.3 CM
ECHO LVOT MEAN GRADIENT: 2 MMHG
ECHO LVOT PEAK GRADIENT: 3 MMHG
ECHO LVOT PEAK VELOCITY: 0.9 M/S
ECHO LVOT SV: 90 ML
ECHO LVOT VTI: 21.7 CM

## 2025-03-04 PROCEDURE — 76937 US GUIDE VASCULAR ACCESS: CPT | Performed by: INTERNAL MEDICINE

## 2025-03-04 PROCEDURE — 94761 N-INVAS EAR/PLS OXIMETRY MLT: CPT

## 2025-03-04 PROCEDURE — 2580000003 HC RX 258

## 2025-03-04 PROCEDURE — 6370000000 HC RX 637 (ALT 250 FOR IP): Performed by: NURSE PRACTITIONER

## 2025-03-04 PROCEDURE — 02RF38Z REPLACEMENT OF AORTIC VALVE WITH ZOOPLASTIC TISSUE, PERCUTANEOUS APPROACH: ICD-10-PCS | Performed by: INTERNAL MEDICINE

## 2025-03-04 PROCEDURE — 93005 ELECTROCARDIOGRAM TRACING: CPT | Performed by: INTERNAL MEDICINE

## 2025-03-04 PROCEDURE — C1760 CLOSURE DEV, VASC: HCPCS | Performed by: INTERNAL MEDICINE

## 2025-03-04 PROCEDURE — 2580000003 HC RX 258: Performed by: INTERNAL MEDICINE

## 2025-03-04 PROCEDURE — 85347 COAGULATION TIME ACTIVATED: CPT

## 2025-03-04 PROCEDURE — 6360000004 HC RX CONTRAST MEDICATION: Performed by: INTERNAL MEDICINE

## 2025-03-04 PROCEDURE — C1769 GUIDE WIRE: HCPCS | Performed by: INTERNAL MEDICINE

## 2025-03-04 PROCEDURE — 7100000001 HC PACU RECOVERY - ADDTL 15 MIN

## 2025-03-04 PROCEDURE — C1894 INTRO/SHEATH, NON-LASER: HCPCS | Performed by: INTERNAL MEDICINE

## 2025-03-04 PROCEDURE — 33210 INSERT ELECTRD/PM CATH SNGL: CPT | Performed by: INTERNAL MEDICINE

## 2025-03-04 PROCEDURE — 2500000003 HC RX 250 WO HCPCS: Performed by: INTERNAL MEDICINE

## 2025-03-04 PROCEDURE — 3700000001 HC ADD 15 MINUTES (ANESTHESIA): Performed by: INTERNAL MEDICINE

## 2025-03-04 PROCEDURE — 6370000000 HC RX 637 (ALT 250 FOR IP): Performed by: STUDENT IN AN ORGANIZED HEALTH CARE EDUCATION/TRAINING PROGRAM

## 2025-03-04 PROCEDURE — 6370000000 HC RX 637 (ALT 250 FOR IP)

## 2025-03-04 PROCEDURE — 6360000002 HC RX W HCPCS: Performed by: INTERNAL MEDICINE

## 2025-03-04 PROCEDURE — 3700000000 HC ANESTHESIA ATTENDED CARE: Performed by: INTERNAL MEDICINE

## 2025-03-04 PROCEDURE — B24BZZZ ULTRASONOGRAPHY OF HEART WITH AORTA: ICD-10-PCS | Performed by: INTERNAL MEDICINE

## 2025-03-04 PROCEDURE — 7100000000 HC PACU RECOVERY - FIRST 15 MIN

## 2025-03-04 PROCEDURE — 33361 REPLACE AORTIC VALVE PERQ: CPT | Performed by: INTERNAL MEDICINE

## 2025-03-04 PROCEDURE — C1892 INTRO/SHEATH,FIXED,PEEL-AWAY: HCPCS | Performed by: INTERNAL MEDICINE

## 2025-03-04 PROCEDURE — 86900 BLOOD TYPING SEROLOGIC ABO: CPT

## 2025-03-04 PROCEDURE — 6360000002 HC RX W HCPCS: Performed by: STUDENT IN AN ORGANIZED HEALTH CARE EDUCATION/TRAINING PROGRAM

## 2025-03-04 PROCEDURE — 2720000010 HC SURG SUPPLY STERILE: Performed by: INTERNAL MEDICINE

## 2025-03-04 PROCEDURE — 33361 REPLACE AORTIC VALVE PERQ: CPT | Performed by: THORACIC SURGERY (CARDIOTHORACIC VASCULAR SURGERY)

## 2025-03-04 PROCEDURE — 2000000000 HC ICU R&B

## 2025-03-04 PROCEDURE — 86901 BLOOD TYPING SEROLOGIC RH(D): CPT

## 2025-03-04 PROCEDURE — C1889 IMPLANT/INSERT DEVICE, NOC: HCPCS | Performed by: INTERNAL MEDICINE

## 2025-03-04 PROCEDURE — 86850 RBC ANTIBODY SCREEN: CPT

## 2025-03-04 PROCEDURE — 2500000003 HC RX 250 WO HCPCS

## 2025-03-04 PROCEDURE — 0JH604Z INSERTION OF PACEMAKER, SINGLE CHAMBER INTO CHEST SUBCUTANEOUS TISSUE AND FASCIA, OPEN APPROACH: ICD-10-PCS | Performed by: INTERNAL MEDICINE

## 2025-03-04 PROCEDURE — 2709999900 HC NON-CHARGEABLE SUPPLY: Performed by: INTERNAL MEDICINE

## 2025-03-04 PROCEDURE — 6360000002 HC RX W HCPCS

## 2025-03-04 DEVICE — DISPOSABLE ADAPTER: Type: IMPLANTABLE DEVICE | Status: FUNCTIONAL

## 2025-03-04 RX ORDER — ATORVASTATIN CALCIUM 40 MG/1
40 TABLET, FILM COATED ORAL NIGHTLY
Status: DISCONTINUED | OUTPATIENT
Start: 2025-03-04 | End: 2025-03-06 | Stop reason: HOSPADM

## 2025-03-04 RX ORDER — SODIUM CHLORIDE 9 MG/ML
INJECTION, SOLUTION INTRAVENOUS PRN
Status: DISCONTINUED | OUTPATIENT
Start: 2025-03-04 | End: 2025-03-06 | Stop reason: HOSPADM

## 2025-03-04 RX ORDER — PROTAMINE SULFATE 10 MG/ML
INJECTION, SOLUTION INTRAVENOUS
Status: COMPLETED
Start: 2025-03-04 | End: 2025-03-04

## 2025-03-04 RX ORDER — PROPOFOL 10 MG/ML
INJECTION, EMULSION INTRAVENOUS
Status: DISPENSED
Start: 2025-03-04 | End: 2025-03-04

## 2025-03-04 RX ORDER — SODIUM CHLORIDE 9 MG/ML
INJECTION, SOLUTION INTRAVENOUS CONTINUOUS
Status: DISCONTINUED | OUTPATIENT
Start: 2025-03-04 | End: 2025-03-05

## 2025-03-04 RX ORDER — HEPARIN SODIUM 1000 [USP'U]/ML
INJECTION, SOLUTION INTRAVENOUS; SUBCUTANEOUS
Status: DISCONTINUED | OUTPATIENT
Start: 2025-03-04 | End: 2025-03-04 | Stop reason: SDUPTHER

## 2025-03-04 RX ORDER — IOPAMIDOL 755 MG/ML
INJECTION, SOLUTION INTRAVASCULAR PRN
Status: DISCONTINUED | OUTPATIENT
Start: 2025-03-04 | End: 2025-03-04 | Stop reason: HOSPADM

## 2025-03-04 RX ORDER — OXYCODONE AND ACETAMINOPHEN 5; 325 MG/1; MG/1
1 TABLET ORAL EVERY 6 HOURS PRN
Status: DISCONTINUED | OUTPATIENT
Start: 2025-03-04 | End: 2025-03-06 | Stop reason: HOSPADM

## 2025-03-04 RX ORDER — LIDOCAINE HYDROCHLORIDE 10 MG/ML
INJECTION, SOLUTION INFILTRATION; PERINEURAL
Status: DISPENSED
Start: 2025-03-04 | End: 2025-03-04

## 2025-03-04 RX ORDER — SODIUM CHLORIDE, SODIUM LACTATE, POTASSIUM CHLORIDE, CALCIUM CHLORIDE 600; 310; 30; 20 MG/100ML; MG/100ML; MG/100ML; MG/100ML
INJECTION, SOLUTION INTRAVENOUS
Status: DISCONTINUED | OUTPATIENT
Start: 2025-03-04 | End: 2025-03-04 | Stop reason: SDUPTHER

## 2025-03-04 RX ORDER — EPHEDRINE SULFATE/0.9% NACL/PF 25 MG/5 ML
SYRINGE (ML) INTRAVENOUS
Status: DISCONTINUED | OUTPATIENT
Start: 2025-03-04 | End: 2025-03-04 | Stop reason: SDUPTHER

## 2025-03-04 RX ORDER — TAMSULOSIN HYDROCHLORIDE 0.4 MG/1
0.4 CAPSULE ORAL DAILY
Status: DISCONTINUED | OUTPATIENT
Start: 2025-03-05 | End: 2025-03-06 | Stop reason: HOSPADM

## 2025-03-04 RX ORDER — FENTANYL CITRATE 50 UG/ML
INJECTION, SOLUTION INTRAMUSCULAR; INTRAVENOUS
Status: DISCONTINUED | OUTPATIENT
Start: 2025-03-04 | End: 2025-03-04 | Stop reason: SDUPTHER

## 2025-03-04 RX ORDER — VANCOMYCIN HYDROCHLORIDE 1 G/20ML
INJECTION, POWDER, LYOPHILIZED, FOR SOLUTION INTRAVENOUS
Status: COMPLETED
Start: 2025-03-04 | End: 2025-03-04

## 2025-03-04 RX ORDER — ISOFLURANE 1 ML/ML
LIQUID RESPIRATORY (INHALATION)
Status: DISPENSED
Start: 2025-03-04 | End: 2025-03-04

## 2025-03-04 RX ORDER — ASPIRIN 81 MG/1
81 TABLET ORAL DAILY
Status: DISCONTINUED | OUTPATIENT
Start: 2025-03-05 | End: 2025-03-04

## 2025-03-04 RX ORDER — MIDODRINE HYDROCHLORIDE 5 MG/1
5 TABLET ORAL ONCE
Status: COMPLETED | OUTPATIENT
Start: 2025-03-04 | End: 2025-03-04

## 2025-03-04 RX ORDER — ETOMIDATE 2 MG/ML
INJECTION INTRAVENOUS
Status: DISCONTINUED | OUTPATIENT
Start: 2025-03-04 | End: 2025-03-04 | Stop reason: SDUPTHER

## 2025-03-04 RX ORDER — IOPAMIDOL 755 MG/ML
INJECTION, SOLUTION INTRAVASCULAR
Status: DISPENSED
Start: 2025-03-04 | End: 2025-03-04

## 2025-03-04 RX ORDER — ASPIRIN 81 MG/1
81 TABLET, CHEWABLE ORAL DAILY
Status: DISCONTINUED | OUTPATIENT
Start: 2025-03-04 | End: 2025-03-06 | Stop reason: HOSPADM

## 2025-03-04 RX ORDER — SODIUM CHLORIDE 0.9 % (FLUSH) 0.9 %
5-40 SYRINGE (ML) INJECTION PRN
Status: DISCONTINUED | OUTPATIENT
Start: 2025-03-04 | End: 2025-03-06 | Stop reason: HOSPADM

## 2025-03-04 RX ORDER — HYDRALAZINE HYDROCHLORIDE 20 MG/ML
10 INJECTION INTRAMUSCULAR; INTRAVENOUS EVERY 6 HOURS PRN
Status: DISCONTINUED | OUTPATIENT
Start: 2025-03-04 | End: 2025-03-06 | Stop reason: HOSPADM

## 2025-03-04 RX ORDER — VANCOMYCIN HYDROCHLORIDE 1 G/20ML
INJECTION, POWDER, LYOPHILIZED, FOR SOLUTION INTRAVENOUS
Status: DISCONTINUED | OUTPATIENT
Start: 2025-03-04 | End: 2025-03-04 | Stop reason: SDUPTHER

## 2025-03-04 RX ORDER — ONDANSETRON 2 MG/ML
INJECTION INTRAMUSCULAR; INTRAVENOUS
Status: DISCONTINUED | OUTPATIENT
Start: 2025-03-04 | End: 2025-03-04 | Stop reason: SDUPTHER

## 2025-03-04 RX ORDER — CLOPIDOGREL BISULFATE 75 MG/1
75 TABLET ORAL DAILY
Status: DISCONTINUED | OUTPATIENT
Start: 2025-03-05 | End: 2025-03-04

## 2025-03-04 RX ORDER — PROTAMINE SULFATE 10 MG/ML
INJECTION, SOLUTION INTRAVENOUS
Status: DISCONTINUED | OUTPATIENT
Start: 2025-03-04 | End: 2025-03-04 | Stop reason: SDUPTHER

## 2025-03-04 RX ORDER — PROPOFOL 10 MG/ML
INJECTION, EMULSION INTRAVENOUS
Status: DISCONTINUED | OUTPATIENT
Start: 2025-03-04 | End: 2025-03-04 | Stop reason: SDUPTHER

## 2025-03-04 RX ORDER — SODIUM CHLORIDE 9 MG/ML
INJECTION, SOLUTION INTRAVENOUS CONTINUOUS
Status: DISCONTINUED | OUTPATIENT
Start: 2025-03-04 | End: 2025-03-04 | Stop reason: HOSPADM

## 2025-03-04 RX ORDER — ACETAMINOPHEN 325 MG/1
650 TABLET ORAL EVERY 4 HOURS PRN
Status: DISCONTINUED | OUTPATIENT
Start: 2025-03-04 | End: 2025-03-06 | Stop reason: HOSPADM

## 2025-03-04 RX ORDER — SODIUM CHLORIDE 0.9 % (FLUSH) 0.9 %
5-40 SYRINGE (ML) INJECTION EVERY 12 HOURS SCHEDULED
Status: DISCONTINUED | OUTPATIENT
Start: 2025-03-04 | End: 2025-03-06 | Stop reason: HOSPADM

## 2025-03-04 RX ORDER — MORPHINE SULFATE 2 MG/ML
1 INJECTION, SOLUTION INTRAMUSCULAR; INTRAVENOUS
Status: COMPLETED | OUTPATIENT
Start: 2025-03-04 | End: 2025-03-04

## 2025-03-04 RX ORDER — PHENYLEPHRINE HCL IN 0.9% NACL 1 MG/10 ML
SYRINGE (ML) INTRAVENOUS
Status: DISCONTINUED | OUTPATIENT
Start: 2025-03-04 | End: 2025-03-04 | Stop reason: SDUPTHER

## 2025-03-04 RX ORDER — TIMOLOL MALEATE 2.5 MG/ML
1 SOLUTION/ DROPS OPHTHALMIC DAILY
Status: DISCONTINUED | OUTPATIENT
Start: 2025-03-04 | End: 2025-03-06 | Stop reason: HOSPADM

## 2025-03-04 RX ORDER — CLOPIDOGREL 300 MG/1
300 TABLET, FILM COATED ORAL ONCE
Status: COMPLETED | OUTPATIENT
Start: 2025-03-04 | End: 2025-03-04

## 2025-03-04 RX ORDER — ROCURONIUM BROMIDE 10 MG/ML
INJECTION, SOLUTION INTRAVENOUS
Status: DISCONTINUED | OUTPATIENT
Start: 2025-03-04 | End: 2025-03-04 | Stop reason: SDUPTHER

## 2025-03-04 RX ADMIN — SODIUM CHLORIDE, PRESERVATIVE FREE 10 ML: 5 INJECTION INTRAVENOUS at 21:23

## 2025-03-04 RX ADMIN — Medication 10 MG: at 08:32

## 2025-03-04 RX ADMIN — EPHEDRINE SULFATE 5 MG: 5 INJECTION INTRAVENOUS at 09:09

## 2025-03-04 RX ADMIN — HEPARIN SODIUM 13000 UNITS: 1000 INJECTION INTRAVENOUS; SUBCUTANEOUS at 08:52

## 2025-03-04 RX ADMIN — SODIUM CHLORIDE: 0.9 INJECTION, SOLUTION INTRAVENOUS at 10:00

## 2025-03-04 RX ADMIN — CLOPIDOGREL BISULFATE 300 MG: 300 TABLET, FILM COATED ORAL at 07:20

## 2025-03-04 RX ADMIN — SODIUM CHLORIDE, POTASSIUM CHLORIDE, SODIUM LACTATE AND CALCIUM CHLORIDE: 600; 310; 30; 20 INJECTION, SOLUTION INTRAVENOUS at 08:06

## 2025-03-04 RX ADMIN — ONDANSETRON 4 MG: 2 INJECTION, SOLUTION INTRAMUSCULAR; INTRAVENOUS at 09:27

## 2025-03-04 RX ADMIN — MIDODRINE HYDROCHLORIDE 5 MG: 5 TABLET ORAL at 18:01

## 2025-03-04 RX ADMIN — ROCURONIUM BROMIDE 20 MG: 10 INJECTION, SOLUTION INTRAVENOUS at 09:00

## 2025-03-04 RX ADMIN — SODIUM CHLORIDE, PRESERVATIVE FREE 10 ML: 5 INJECTION INTRAVENOUS at 10:42

## 2025-03-04 RX ADMIN — Medication 100 MCG: at 08:30

## 2025-03-04 RX ADMIN — PHENYLEPHRINE HYDROCHLORIDE 50 MCG/MIN: 10 INJECTION INTRAVENOUS at 08:54

## 2025-03-04 RX ADMIN — PROPOFOL 15 MCG/KG/MIN: 10 INJECTION, EMULSION INTRAVENOUS at 08:06

## 2025-03-04 RX ADMIN — MORPHINE SULFATE 1 MG: 2 INJECTION, SOLUTION INTRAMUSCULAR; INTRAVENOUS at 14:59

## 2025-03-04 RX ADMIN — Medication 100 MCG: at 09:16

## 2025-03-04 RX ADMIN — Medication 20 MG: at 08:02

## 2025-03-04 RX ADMIN — ROCURONIUM BROMIDE 50 MG: 10 INJECTION, SOLUTION INTRAVENOUS at 08:47

## 2025-03-04 RX ADMIN — PROTAMINE SULFATE 10 MG: 10 INJECTION, SOLUTION INTRAVENOUS at 09:24

## 2025-03-04 RX ADMIN — EPHEDRINE SULFATE 5 MG: 5 INJECTION INTRAVENOUS at 09:13

## 2025-03-04 RX ADMIN — Medication 100 MCG: at 08:52

## 2025-03-04 RX ADMIN — OXYCODONE HYDROCHLORIDE AND ACETAMINOPHEN 1 TABLET: 5; 325 TABLET ORAL at 19:17

## 2025-03-04 RX ADMIN — APIXABAN 5 MG: 5 TABLET, FILM COATED ORAL at 21:21

## 2025-03-04 RX ADMIN — EPHEDRINE SULFATE 10 MG: 5 INJECTION INTRAVENOUS at 08:56

## 2025-03-04 RX ADMIN — ETOMIDATE 12 MG: 2 INJECTION INTRAVENOUS at 08:47

## 2025-03-04 RX ADMIN — FENTANYL CITRATE 25 MCG: 50 INJECTION, SOLUTION INTRAMUSCULAR; INTRAVENOUS at 08:02

## 2025-03-04 RX ADMIN — MORPHINE SULFATE 1 MG: 2 INJECTION, SOLUTION INTRAMUSCULAR; INTRAVENOUS at 11:25

## 2025-03-04 RX ADMIN — FENTANYL CITRATE 50 MCG: 50 INJECTION, SOLUTION INTRAMUSCULAR; INTRAVENOUS at 08:47

## 2025-03-04 RX ADMIN — SUGAMMADEX 200 MG: 100 INJECTION, SOLUTION INTRAVENOUS at 09:40

## 2025-03-04 RX ADMIN — SODIUM CHLORIDE: 0.9 INJECTION, SOLUTION INTRAVENOUS at 07:37

## 2025-03-04 RX ADMIN — SODIUM CHLORIDE: 0.9 INJECTION, SOLUTION INTRAVENOUS at 16:35

## 2025-03-04 RX ADMIN — VANCOMYCIN HYDROCHLORIDE 1000 MG: 1 INJECTION, POWDER, LYOPHILIZED, FOR SOLUTION INTRAVENOUS at 08:22

## 2025-03-04 RX ADMIN — Medication 200 MCG: at 09:06

## 2025-03-04 RX ADMIN — ATORVASTATIN CALCIUM 40 MG: 40 TABLET, FILM COATED ORAL at 21:21

## 2025-03-04 RX ADMIN — PROTAMINE SULFATE 20 MG: 10 INJECTION, SOLUTION INTRAVENOUS at 09:25

## 2025-03-04 RX ADMIN — TIMOLOL MALEATE 1 DROP: 2.5 SOLUTION OPHTHALMIC at 21:21

## 2025-03-04 RX ADMIN — Medication 20 MG: at 08:23

## 2025-03-04 RX ADMIN — EPHEDRINE SULFATE 5 MG: 5 INJECTION INTRAVENOUS at 09:14

## 2025-03-04 RX ADMIN — PROTAMINE SULFATE 20 MG: 10 INJECTION, SOLUTION INTRAVENOUS at 09:26

## 2025-03-04 RX ADMIN — SODIUM CHLORIDE: 0.9 INJECTION, SOLUTION INTRAVENOUS at 16:01

## 2025-03-04 RX ADMIN — FENTANYL CITRATE 25 MCG: 50 INJECTION, SOLUTION INTRAMUSCULAR; INTRAVENOUS at 08:41

## 2025-03-04 ASSESSMENT — PAIN SCALES - GENERAL: PAINLEVEL_OUTOF10: 0

## 2025-03-04 NOTE — PROCEDURES
perivalvular leak buy subsequent imaging and  [] CHRIS or [x] TTE    Final hemodynamic measurements crossing the valve easily suggest mean gradient of: 0 mm Hg.     Arterial access was then examined, and closed successfully, with a closure device,  [x] Manta for tight common femoral artery  []  Angio-Seal  [x] Mynx Left common femoral artery.     It was done with no complication.     The protamine was then administered.     The patient tolerated the procedure well.    Estimated Blood Loss:  [x] Minimal 10-25 cc [] Minimal < 25 cc [] Moderate 25-50 cc  [] >50 cc    IMPRESSION:    Successful Transcatheter Artic valve replacement using 29 mm Sapein S3 UR  Successful temporary pacer implant.   Successful closure all access with no complications.    RECOMMENDATIONS:  Post TAVR protocol  Temporary pacer management if stayed in place.  TTE in 24 hours.    Discussed with patient family and nursing.    Electronically signed by Vidhi Burns MD on 3/4/2025 at 9:38 AM.  [facility]

## 2025-03-04 NOTE — H&P
[facility]  History and Physical (H&P)             Date:   3/4/2025  Patient name: Grupo Pérez  Date of admission:  3/4/2025  6:34 AM  MRN:   [id number]  YOB: 1940      Date:   3/4/2025  Patient name: Grupo Pérez  Date of admission:  3/4/2025  6:34 AM  MRN:   [id number]  YOB: 1940    Reason for Admission:  Severe symptomatic aortic regurgitation and stenosis.     CHIEF COMPLAINT:  SOB.      History Obtained From:  SOB.     HISTORY OF PRESENT ILLNESS:      Denies any exertional chest pain. Does feel tired and short of breath on just mild exertion. No dizziness no lightheadedness no syncope.     STS Score: 1.94%  Morbidity / Mortality: 9.92%  KCCQ 18  NYHA class II  Past Medical History:   has a past medical history of Arterial thrombosis (HCC), Arthritis, Blood circulation, collateral, Bradycardia, CAD (coronary artery disease), Former smoker, GERD (gastroesophageal reflux disease), History of cardiac cath, Hyperlipidemia, Hypertension, and S/P transesophageal echocardiogram (CHRIS).    Past Surgical History:   has a past surgical history that includes joint replacement; hernia repair; Tonsillectomy; Endoscopy, colon, diagnostic; Colonoscopy; Cardiac procedure (N/A, 02/05/2025); Cardiac procedure (N/A, 02/05/2025); invasive vascular (N/A, 02/05/2025); thrombectomy (Right, 07/2013); joint replacement (2009); and Total hip arthroplasty (2009).     Home Medications:    Prior to Admission medications    Medication Sig Start Date End Date Taking? Authorizing Provider   clotrimazole-betamethasone (LOTRISONE) 1-0.05 % cream Apply topically 2 times daily Apply topically 2 times daily.    ProviderMarisa MD   meclizine (ANTIVERT) 12.5 MG tablet Take 1 tablet by mouth 3 times daily as needed    Marisa Patricio MD   oxyCODONE-acetaminophen (PERCOCET) 5-325 MG per tablet Take 1 tablet by mouth every 6 hours as needed for Pain.    Marisa Patricio MD

## 2025-03-04 NOTE — ANESTHESIA PRE PROCEDURE
Moderate to severe regurgitation.  •  Mitral Valve: Mild regurgitation.  •  Tricuspid Valve: Moderate to severe regurgitation.  •  Left Atrium: Left atrium is severely dilated. Severe spontaneous echo contrast is seen in the left atrium. No left atrial appendage thrombus noted. Spontaneous echo contrast seen in the left atrial appendage.  •  Right Atrium: Right atrium is severely dilated.  •  Image quality is good.    Anesthesia Evaluation  Patient summary reviewed  Airway: Mallampati: II  TM distance: >3 FB   Neck ROM: limited  Mouth opening: > = 3 FB   Dental:          Pulmonary: breath sounds clear to auscultation                             Cardiovascular:    (+) hypertension:, CAD:, murmur        Rhythm: regular  Rate: abnormal                    Neuro/Psych:               GI/Hepatic/Renal:   (+) GERD:          Endo/Other:                     Abdominal:             Vascular:          Other Findings:         Anesthesia Plan      TIVA and MAC     ASA 4       Induction: intravenous.  arterial line  MIPS: Prophylactic antiemetics administered.  Anesthetic plan and risks discussed with patient.    Use of blood products discussed with patient whom consented to blood products.    Plan discussed with CRNA.    Attending anesthesiologist reviewed and agrees with Preprocedure content            Daria Red MD   3/4/2025

## 2025-03-04 NOTE — ANESTHESIA POSTPROCEDURE EVALUATION
Department of Anesthesiology  Postprocedure Note    Patient: Grupo Pérez  MRN: [id number]  YOB: 1940  Date of evaluation: 3/4/2025    Procedure Summary       Date: 03/04/25 Room / Location: [place] [address] / [facility]    Anesthesia Start: 0751 Anesthesia Stop: 0958    Procedures:       taleb / Transcatheter aortic valve replacement      Ultrasound guided vascular access (20514)      TRANSCATHETER AORTIC VALVE REPLACEMENT FEMORAL APPROACH Diagnosis:       Aortic valve stenosis, etiology of cardiac valve disease unspecified      (Aortic valve stenosis, etiology of cardiac valve disease unspecified [I35.0])    Surgeons: Vidhi Burns MD; Vignesh Hogan MD Responsible Provider: Daria Red MD    Anesthesia Type: Not recorded ASA Status: Not recorded            Anesthesia Type: No value filed.    Ana Phase I: Ana Score: 9    Ana Phase II:      Anesthesia Post Evaluation    Patient location during evaluation: ICU  Patient participation: complete - patient participated  Level of consciousness: awake and alert  Airway patency: patent  Nausea & Vomiting: no nausea and no vomiting  Cardiovascular status: blood pressure returned to baseline  Respiratory status: acceptable  Hydration status: euvolemic  Comments: No known anesthesia related complication  Multimodal analgesia pain management approach  Pain management: adequate    There were no known notable events for this encounter.

## 2025-03-05 ENCOUNTER — APPOINTMENT (OUTPATIENT)
Age: 85
DRG: 267 | End: 2025-03-05
Attending: INTERNAL MEDICINE
Payer: COMMERCIAL

## 2025-03-05 LAB
ANION GAP SERPL CALCULATED.3IONS-SCNC: 9 MMOL/L (ref 9–16)
BUN SERPL-MCNC: 38 MG/DL (ref 8–23)
CALCIUM SERPL-MCNC: 8.4 MG/DL (ref 8.6–10.4)
CHLORIDE SERPL-SCNC: 110 MMOL/L (ref 98–107)
CO2 SERPL-SCNC: 19 MMOL/L (ref 20–31)
CREAT SERPL-MCNC: 0.8 MG/DL (ref 0.7–1.2)
EKG ATRIAL RATE: 326 BPM
EKG ATRIAL RATE: 70 BPM
EKG Q-T INTERVAL: 468 MS
EKG Q-T INTERVAL: 490 MS
EKG QRS DURATION: 108 MS
EKG QRS DURATION: 120 MS
EKG QTC CALCULATION (BAZETT): 439 MS
EKG QTC CALCULATION (BAZETT): 490 MS
EKG R AXIS: -16 DEGREES
EKG R AXIS: 1 DEGREES
EKG T AXIS: 62 DEGREES
EKG T AXIS: 69 DEGREES
EKG VENTRICULAR RATE: 53 BPM
EKG VENTRICULAR RATE: 60 BPM
ERYTHROCYTE [DISTWIDTH] IN BLOOD BY AUTOMATED COUNT: 16 % (ref 11.8–14.4)
GFR, ESTIMATED: 88 ML/MIN/1.73M2
GLUCOSE SERPL-MCNC: 103 MG/DL (ref 74–99)
HCT VFR BLD AUTO: 31.8 % (ref 40.7–50.3)
HGB BLD-MCNC: 10 G/DL (ref 13–17)
MCH RBC QN AUTO: 30.4 PG (ref 25.2–33.5)
MCHC RBC AUTO-ENTMCNC: 31.4 G/DL (ref 28.4–34.8)
MCV RBC AUTO: 96.7 FL (ref 82.6–102.9)
NRBC BLD-RTO: 0 PER 100 WBC
PLATELET # BLD AUTO: 121 K/UL (ref 138–453)
PMV BLD AUTO: 9.1 FL (ref 8.1–13.5)
POTASSIUM SERPL-SCNC: 4.9 MMOL/L (ref 3.7–5.3)
RBC # BLD AUTO: 3.29 M/UL (ref 4.21–5.77)
SODIUM SERPL-SCNC: 138 MMOL/L (ref 136–145)
WBC OTHER # BLD: 4.8 K/UL (ref 3.5–11.3)

## 2025-03-05 PROCEDURE — 6370000000 HC RX 637 (ALT 250 FOR IP): Performed by: STUDENT IN AN ORGANIZED HEALTH CARE EDUCATION/TRAINING PROGRAM

## 2025-03-05 PROCEDURE — 93306 TTE W/DOPPLER COMPLETE: CPT

## 2025-03-05 PROCEDURE — 2500000003 HC RX 250 WO HCPCS: Performed by: INTERNAL MEDICINE

## 2025-03-05 PROCEDURE — 99291 CRITICAL CARE FIRST HOUR: CPT | Performed by: INTERNAL MEDICINE

## 2025-03-05 PROCEDURE — 80048 BASIC METABOLIC PNL TOTAL CA: CPT

## 2025-03-05 PROCEDURE — 2580000003 HC RX 258: Performed by: INTERNAL MEDICINE

## 2025-03-05 PROCEDURE — 93010 ELECTROCARDIOGRAM REPORT: CPT | Performed by: INTERNAL MEDICINE

## 2025-03-05 PROCEDURE — 85027 COMPLETE CBC AUTOMATED: CPT

## 2025-03-05 PROCEDURE — 2060000000 HC ICU INTERMEDIATE R&B

## 2025-03-05 PROCEDURE — 36415 COLL VENOUS BLD VENIPUNCTURE: CPT

## 2025-03-05 PROCEDURE — 6370000000 HC RX 637 (ALT 250 FOR IP): Performed by: NURSE PRACTITIONER

## 2025-03-05 PROCEDURE — 02PA3MZ REMOVAL OF CARDIAC LEAD FROM HEART, PERCUTANEOUS APPROACH: ICD-10-PCS | Performed by: INTERNAL MEDICINE

## 2025-03-05 RX ADMIN — TAMSULOSIN HYDROCHLORIDE 0.4 MG: 0.4 CAPSULE ORAL at 08:13

## 2025-03-05 RX ADMIN — OXYCODONE HYDROCHLORIDE AND ACETAMINOPHEN 1 TABLET: 5; 325 TABLET ORAL at 03:48

## 2025-03-05 RX ADMIN — SODIUM CHLORIDE, PRESERVATIVE FREE 10 ML: 5 INJECTION INTRAVENOUS at 20:39

## 2025-03-05 RX ADMIN — OXYCODONE HYDROCHLORIDE AND ACETAMINOPHEN 1 TABLET: 5; 325 TABLET ORAL at 11:06

## 2025-03-05 RX ADMIN — APIXABAN 5 MG: 5 TABLET, FILM COATED ORAL at 08:13

## 2025-03-05 RX ADMIN — SODIUM CHLORIDE, PRESERVATIVE FREE 10 ML: 5 INJECTION INTRAVENOUS at 08:14

## 2025-03-05 RX ADMIN — APIXABAN 5 MG: 5 TABLET, FILM COATED ORAL at 20:39

## 2025-03-05 RX ADMIN — TIMOLOL MALEATE 1 DROP: 2.5 SOLUTION OPHTHALMIC at 20:38

## 2025-03-05 RX ADMIN — ASPIRIN 81 MG: 81 TABLET, CHEWABLE ORAL at 08:13

## 2025-03-05 RX ADMIN — ATORVASTATIN CALCIUM 40 MG: 40 TABLET, FILM COATED ORAL at 20:39

## 2025-03-05 RX ADMIN — SODIUM CHLORIDE: 0.9 INJECTION, SOLUTION INTRAVENOUS at 04:32

## 2025-03-05 ASSESSMENT — PAIN DESCRIPTION - ORIENTATION
ORIENTATION: RIGHT;MID
ORIENTATION: RIGHT;MID

## 2025-03-05 ASSESSMENT — PAIN DESCRIPTION - LOCATION
LOCATION: BACK;SHOULDER
LOCATION: BACK;SHOULDER

## 2025-03-05 ASSESSMENT — PAIN SCALES - GENERAL
PAINLEVEL_OUTOF10: 6
PAINLEVEL_OUTOF10: 8

## 2025-03-05 NOTE — CARE COORDINATION
03/05/25 1659   Readmission Assessment   Number of Days since last admission? 8-30 days   Previous Disposition Home with Family   Who is being Interviewed Patient   What was the patient's/caregiver's perception as to why they think they needed to return back to the hospital? Other (Comment)  (Patient returned for a planned procedure.)   Did you visit your Primary Care Physician after you left the hospital, before you returned this time? Yes   Did you see a specialist, such as Cardiac, Pulmonary, Orthopedic Physician, etc. after you left the hospital? Yes   Who advised the patient to return to the hospital? Physician   Does the patient report anything that got in the way of taking their medications? No   In our efforts to provide the best possible care to you and others like you, can you think of anything that we could have done to help you after you left the hospital the first time, so that you might not have needed to return so soon? Other (Comment)  (Patient returned for a planned procedure.)

## 2025-03-05 NOTE — PROGRESS NOTES
Physician Progress Note      PATIENT:               JUAN MONTOYA  CSN #:                  [id number]  :                       1940  ADMIT DATE:       3/4/2025 6:34 AM  DISCH DATE:  RESPONDING  PROVIDER #:        Marcelino Briones DO          QUERY TEXT:    Patient admitted for TAVR, noted to have  atrial fibrillation and is   maintained on Eliquis. If possible, please document in progress notes and   discharge summary if you are evaluating and/or treating any of the following:?  ?  The medical record reflects the following:  Risk Factors: A FIb, Severe aortic stenosis s/p TAVR 3//25, age 84, HTN  Clinical Indicators: per 3/5 cardiology PN; Chronic atrial fibrillation, on   Eliquis at home.  Treatment: Eliquis  Options provided:  -- Secondary hypercoagulable state in a patient with atrial fibrillation  -- Other - I will add my own diagnosis  -- Disagree - Not applicable / Not valid  -- Disagree - Clinically unable to determine / Unknown  -- Refer to Clinical Documentation Reviewer    PROVIDER RESPONSE TEXT:    This patient has secondary hypercoagulable state in a patient with atrial   fibrillation.    Query created by: Chrissy Moody on 3/5/2025 2:16 PM      Electronically signed by:  Marcelino Briones DO 3/5/2025 2:33 PM          
Received into pcc room 15 per wheelchair.  Assessment and vitals as charted.  All procedures explained prior to implementation.     All consents witnessed and signed.  Family members in attendance.  Patient ready for procedure  
Spiritual Health History and Assessment/Progress Note  [facility]    (P) Initial Encounter,  ,  ,      Name: Grupo Pérez MRN: [id number]    Age: 84 y.o.     Sex: male   Language: English   Evangelical: Jew   S/P TAVR (transcatheter aortic valve replacement)     Date: 3/4/2025            Total Time Calculated: (P) 10 min              Spiritual Assessment began in [facility]- SICU        Referral/Consult From: (P) Rounding   Encounter Overview/Reason: (P) Initial Encounter  Service Provided For: (P) Patient    Patricia, Belief, Meaning:   Patient is connected with a patricia tradition or spiritual practice  Family/Friends No family/friends present      Importance and Influence:  Patient has no beliefs influential to healthcare decision-making identified during this visit  Family/Friends No family/friends present    Community:  Patient is connected with a spiritual community  Family/Friends No family/friends present    Assessment and Plan of Care:     Patient Interventions include: Other:  provided a supportive presence through active listening and words of affirmation.  Family/Friends Interventions include: No family/friends present    Patient Plan of Care: Spiritual Care available upon further referral  Family/Friends Plan of Care: No family/friends present    Electronically signed by Chaplain Kacey on 3/4/2025 at 7:55 PM   
Atrium: Left atrium is severely dilated. Severe spontaneous echo contrast is seen in the left atrium. No left atrial appendage thrombus noted. Spontaneous echo contrast seen in the left atrial appendage.    Right Atrium: Right atrium is severely dilated.    Image quality is good.       LAST CATH:   2/2025  Femoral artery and vein access using fluoroscopy and ultrasound.  Mild coronary artery disease.  Severely restricted aortic valve with severe stenosis by CHRIS with at least moderate to severe aortic regurgitation.  Severe aortic stenosis by cath with mean gradient of 30 2 aortic valve area of 0.8.  Cardiac output was 3.6 and cardiac index 1.8.  Moderately increased right sided and pulmonary pressure.  LVEDP 11 mm Hg.          Assessment:   Severe aortic stenosis s/p TAVR 3//25  Mild CAD on recent left heart cath  Chronic atrial fibrillation, on Eliquis at home  Hx of DVT in the past  Chronic HTN    Plan:   Continue aspirin and Eliquis.  Temporary pacemaker was removed without any complications.  Resume antihypertensives as needed  Follow up 2D echocardiogram      Oneal Mckeon MD  Fellow, cardiovascular diseases  [facility]  3/5/2025, 11:32 AM    Attending Cardiologist Addendum: I have reviewed and performed the history, physical, subjective, objective, assessment, and plan with the student/resident/fellow/APN and agree with the note. I performed the history and physical personally. I have done the MDM. I have made changes to the note above as needed.    To stepdown today  Hopefully home tomorrow    Discussed with patient in detail. All questions answered. Agrees with plan as outlined above.     Thank you for allowing me to participate in the care of this patient, please do not hesitate to call if you have any questions.    Marcelino Briones, , FACC, FACOI, RPVI, FASE, FASNC  [facility]  [facility]  [phone]

## 2025-03-05 NOTE — OP NOTE
Procedure Date:                                  3/4/2025  Patient name:              Grupo Pérez  Date of admission:      3/4/2025  6:34 AM  MRN:                           [id number]  YOB: 1940     Reason for Admission:  Severe symptomatic aortic regurgitation and stenosis.      ASA Class:                  [] I [x] II [] III [] IV     Mallampati Class:       [] I [x] II [] III [] IV     STS Score: 1.94%  Morbidity / Mortality: 9.92%  KCCQ 18  NYHA class II        PREOPERATIVE DIAGNOSIS:     Severe aortic stenosis and regurgitation.  Advance Age.     POSTOPERATIVE DIAGNOSIS:     Same        PROCEDURE PERFORMED:                             Transcatheter aortic valve replacement using a 29 mm Sapein S3 UR Femoral approach. Serial # [id number]  Closure devices for both common femoral arteries  Selective angiography [x]Femoral []Iliac  Multiple aortic root injections  Temporary venous pacemaker placement via left Femoral vein.         Operators:     Interventional Cardiologist:             [x] ROMAN Burns M.D     Assistant Dr. Percy Thompson MD     CV Surgeon:                                        [x] ISRAEL Burr MD   [] TREVER Cruz M.D     Anesthesia:      [x] General      [] MAC     History Obtained From:  Patient and medical records     HISTORY OF PRESENT ILLNESS:       The patient Grupo Pérez is a 84 years old who had been thoroughly evaluated by the TAVR team. Known with severe symptomatic aortic regurgitation and  stenosis. Seen in structural heart team. Patient was referred for transcatheter aortic valve replacement.     Past Medical History:   has a past medical history of Arterial thrombosis (HCC), Arthritis, Blood circulation, collateral, Bradycardia, CAD (coronary artery disease), Former smoker, GERD (gastroesophageal reflux disease), History of cardiac cath, Hyperlipidemia, Hypertension, and S/P transesophageal echocardiogram (CHRIS).     Past Surgical

## 2025-03-05 NOTE — CARE COORDINATION
Case Management Assessment  Initial Evaluation    Date/Time of Evaluation: 3/5/2025 4:57 PM  Assessment Completed by: NASRIN AGARWAL    If patient is discharged prior to next notation, then this note serves as note for discharge by case management.    Patient Name: Grupo Pérez                   YOB: 1940  Diagnosis: Aortic valve stenosis, etiology of cardiac valve disease unspecified [I35.0]  S/P TAVR (transcatheter aortic valve replacement) [Z95.2]                   Date / Time: 3/4/2025  6:34 AM    Patient Admission Status: Inpatient   Readmission Risk (Low < 19, Mod (19-27), High > 27): Readmission Risk Score: 12.5    Current PCP: Roger Mcmullen MD  PCP verified by CM? (P) Yes    Chart Reviewed: Yes      History Provided by: (P) Patient  Patient Orientation: (P) Alert and Oriented, Person, Place, Situation, Self    Patient Cognition: (P) Alert    Hospitalization in the last 30 days (Readmission):  Yes    If yes, Readmission Assessment in CM Navigator will be completed.    Advance Directives:      Code Status: Full Code   Patient's Primary Decision Maker is: (P) Legal Next of Kin      Discharge Planning:    Patient lives with: (P) Spouse/Significant Other Type of Home: (P) House  Primary Care Giver: (P) Self  Patient Support Systems include: (P) Spouse/Significant Other   Current Financial resources: (P) Medicare  Current community resources:    Current services prior to admission: (P) Durable Medical Equipment            Current DME: (P) Walker            Type of Home Care services:  (P) None    ADLS  Prior functional level: (P) Independent in ADLs/IADLs  Current functional level: (P) Independent in ADLs/IADLs    PT AM-PAC:   /24  OT AM-PAC:   /24    Family can provide assistance at DC: (P) Yes  Would you like Case Management to discuss the discharge plan with any other family members/significant others, and if so, who? (P) No  Plans to Return to Present Housing: (P) Yes  Other

## 2025-03-06 VITALS
HEART RATE: 63 BPM | HEIGHT: 70 IN | OXYGEN SATURATION: 97 % | TEMPERATURE: 98.1 F | SYSTOLIC BLOOD PRESSURE: 148 MMHG | BODY MASS INDEX: 27.77 KG/M2 | WEIGHT: 194 LBS | RESPIRATION RATE: 16 BRPM | DIASTOLIC BLOOD PRESSURE: 53 MMHG

## 2025-03-06 LAB
ANION GAP SERPL CALCULATED.3IONS-SCNC: 10 MMOL/L (ref 9–16)
BUN SERPL-MCNC: 38 MG/DL (ref 8–23)
CALCIUM SERPL-MCNC: 9.1 MG/DL (ref 8.6–10.4)
CHLORIDE SERPL-SCNC: 108 MMOL/L (ref 98–107)
CO2 SERPL-SCNC: 19 MMOL/L (ref 20–31)
CREAT SERPL-MCNC: 1 MG/DL (ref 0.7–1.2)
ECHO AO ASC DIAM: 3.9 CM
ECHO AO ASCENDING AORTA INDEX: 1.89 CM/M2
ECHO AO ROOT DIAM: 3.9 CM
ECHO AO ROOT INDEX: 1.89 CM/M2
ECHO AR MAX VEL PISA: 4.7 M/S
ECHO AV AREA PEAK VELOCITY: 1.8 CM2
ECHO AV AREA VTI: 1.8 CM2
ECHO AV AREA/BSA PEAK VELOCITY: 0.9 CM2/M2
ECHO AV AREA/BSA VTI: 0.9 CM2/M2
ECHO AV MEAN GRADIENT: 5 MMHG
ECHO AV MEAN VELOCITY: 1.1 M/S
ECHO AV PEAK GRADIENT: 13 MMHG
ECHO AV PEAK VELOCITY: 1.8 M/S
ECHO AV REGURGITANT PHT: 380 MS
ECHO AV VELOCITY RATIO: 0.56
ECHO AV VTI: 33.7 CM
ECHO BSA: 2.08 M2
ECHO EST RA PRESSURE: 8 MMHG
ECHO IVC EXP: 2.6 CM
ECHO IVC INSP: 1.3 CM
ECHO LA AREA 2C: 47.4 CM2
ECHO LA AREA 4C: 52.9 CM2
ECHO LA DIAMETER INDEX: 3.11 CM/M2
ECHO LA DIAMETER: 6.4 CM
ECHO LA MAJOR AXIS: 10.3 CM
ECHO LA MINOR AXIS: 9.3 CM
ECHO LA TO AORTIC ROOT RATIO: 1.64
ECHO LA VOL BP: 218 ML (ref 18–58)
ECHO LA VOL MOD A2C: 206 ML (ref 18–58)
ECHO LA VOL MOD A4C: 210 ML (ref 18–58)
ECHO LA VOL/BSA BIPLANE: 106 ML/M2 (ref 16–34)
ECHO LA VOLUME INDEX MOD A2C: 100 ML/M2 (ref 16–34)
ECHO LA VOLUME INDEX MOD A4C: 102 ML/M2 (ref 16–34)
ECHO LV EDV A2C: 97 ML
ECHO LV EDV A4C: 78 ML
ECHO LV EDV INDEX A4C: 38 ML/M2
ECHO LV EDV NDEX A2C: 47 ML/M2
ECHO LV EF PHYSICIAN: 64 %
ECHO LV EJECTION FRACTION A2C: 65 %
ECHO LV EJECTION FRACTION A4C: 59 %
ECHO LV EJECTION FRACTION BIPLANE: 64 % (ref 55–100)
ECHO LV ESV A2C: 34 ML
ECHO LV ESV A4C: 32 ML
ECHO LV ESV INDEX A2C: 17 ML/M2
ECHO LV ESV INDEX A4C: 16 ML/M2
ECHO LV FRACTIONAL SHORTENING: 23 % (ref 28–44)
ECHO LV INTERNAL DIMENSION DIASTOLE INDEX: 2.28 CM/M2
ECHO LV INTERNAL DIMENSION DIASTOLIC: 4.7 CM (ref 4.2–5.9)
ECHO LV INTERNAL DIMENSION SYSTOLIC INDEX: 1.75 CM/M2
ECHO LV INTERNAL DIMENSION SYSTOLIC: 3.6 CM
ECHO LV IVSD: 1.3 CM (ref 0.6–1)
ECHO LV MASS 2D: 237.9 G (ref 88–224)
ECHO LV MASS INDEX 2D: 115.5 G/M2 (ref 49–115)
ECHO LV POSTERIOR WALL DIASTOLIC: 1.3 CM (ref 0.6–1)
ECHO LV RELATIVE WALL THICKNESS RATIO: 0.55
ECHO LVOT AREA: 3.1 CM2
ECHO LVOT AV VTI INDEX: 0.57
ECHO LVOT DIAM: 2 CM
ECHO LVOT MEAN GRADIENT: 1 MMHG
ECHO LVOT MEAN GRADIENT: 1 MMHG
ECHO LVOT PEAK GRADIENT: 4 MMHG
ECHO LVOT PEAK VELOCITY: 1 M/S
ECHO LVOT STROKE VOLUME INDEX: 29.3 ML/M2
ECHO LVOT SV: 60.3 ML
ECHO LVOT VTI: 19.2 CM
ECHO MV AREA VTI: 2.1 CM2
ECHO MV E DECELERATION TIME (DT): 145 MS
ECHO MV E VELOCITY: 1.62 M/S
ECHO MV LVOT VTI INDEX: 1.47
ECHO MV MAX VELOCITY: 1.4 M/S
ECHO MV MEAN GRADIENT: 3 MMHG
ECHO MV MEAN VELOCITY: 0.7 M/S
ECHO MV PEAK GRADIENT: 8 MMHG
ECHO MV VTI: 28.3 CM
ECHO PV MAX VELOCITY: 1 M/S
ECHO PV PEAK GRADIENT: 4 MMHG
ECHO RIGHT VENTRICULAR SYSTOLIC PRESSURE (RVSP): 57 MMHG
ECHO RV BASAL DIMENSION: 5.3 CM
ECHO RV FREE WALL PEAK S': 10 CM/S
ECHO RV TAPSE: 1.9 CM (ref 1.7–?)
ECHO TV REGURGITANT MAX VELOCITY: 3.5 M/S
ECHO TV REGURGITANT PEAK GRADIENT: 49 MMHG
EKG ATRIAL RATE: 43 BPM
EKG Q-T INTERVAL: 502 MS
EKG QRS DURATION: 102 MS
EKG QTC CALCULATION (BAZETT): 471 MS
EKG R AXIS: 22 DEGREES
EKG T AXIS: 63 DEGREES
EKG VENTRICULAR RATE: 53 BPM
ERYTHROCYTE [DISTWIDTH] IN BLOOD BY AUTOMATED COUNT: 16 % (ref 11.8–14.4)
GFR, ESTIMATED: 75 ML/MIN/1.73M2
GLUCOSE SERPL-MCNC: 119 MG/DL (ref 74–99)
HCT VFR BLD AUTO: 33.5 % (ref 40.7–50.3)
HGB BLD-MCNC: 10.7 G/DL (ref 13–17)
MCH RBC QN AUTO: 30.6 PG (ref 25.2–33.5)
MCHC RBC AUTO-ENTMCNC: 31.9 G/DL (ref 28.4–34.8)
MCV RBC AUTO: 95.7 FL (ref 82.6–102.9)
NRBC BLD-RTO: 0 PER 100 WBC
PLATELET # BLD AUTO: 129 K/UL (ref 138–453)
PMV BLD AUTO: 9.6 FL (ref 8.1–13.5)
POTASSIUM SERPL-SCNC: 5.4 MMOL/L (ref 3.7–5.3)
RBC # BLD AUTO: 3.5 M/UL (ref 4.21–5.77)
SODIUM SERPL-SCNC: 137 MMOL/L (ref 136–145)
WBC OTHER # BLD: 6.5 K/UL (ref 3.5–11.3)

## 2025-03-06 PROCEDURE — 99239 HOSP IP/OBS DSCHRG MGMT >30: CPT | Performed by: NURSE PRACTITIONER

## 2025-03-06 PROCEDURE — 93010 ELECTROCARDIOGRAM REPORT: CPT | Performed by: INTERNAL MEDICINE

## 2025-03-06 PROCEDURE — 80048 BASIC METABOLIC PNL TOTAL CA: CPT

## 2025-03-06 PROCEDURE — 6370000000 HC RX 637 (ALT 250 FOR IP): Performed by: NURSE PRACTITIONER

## 2025-03-06 PROCEDURE — 2500000003 HC RX 250 WO HCPCS: Performed by: INTERNAL MEDICINE

## 2025-03-06 PROCEDURE — 85027 COMPLETE CBC AUTOMATED: CPT

## 2025-03-06 PROCEDURE — 6370000000 HC RX 637 (ALT 250 FOR IP): Performed by: STUDENT IN AN ORGANIZED HEALTH CARE EDUCATION/TRAINING PROGRAM

## 2025-03-06 PROCEDURE — 36415 COLL VENOUS BLD VENIPUNCTURE: CPT

## 2025-03-06 RX ORDER — LORAZEPAM 0.5 MG/1
0.5 TABLET ORAL EVERY 4 HOURS PRN
Status: DISCONTINUED | OUTPATIENT
Start: 2025-03-06 | End: 2025-03-06 | Stop reason: HOSPADM

## 2025-03-06 RX ORDER — ASPIRIN 81 MG/1
81 TABLET, CHEWABLE ORAL DAILY
Qty: 30 TABLET | Refills: 3 | Status: SHIPPED | OUTPATIENT
Start: 2025-03-06

## 2025-03-06 RX ADMIN — ASPIRIN 81 MG: 81 TABLET, CHEWABLE ORAL at 09:20

## 2025-03-06 RX ADMIN — APIXABAN 5 MG: 5 TABLET, FILM COATED ORAL at 09:20

## 2025-03-06 RX ADMIN — TAMSULOSIN HYDROCHLORIDE 0.4 MG: 0.4 CAPSULE ORAL at 09:20

## 2025-03-06 RX ADMIN — LORAZEPAM 0.5 MG: 0.5 TABLET ORAL at 11:55

## 2025-03-06 RX ADMIN — SODIUM CHLORIDE, PRESERVATIVE FREE 10 ML: 5 INJECTION INTRAVENOUS at 09:20

## 2025-03-06 NOTE — DISCHARGE SUMMARY
daily     tamsulosin 0.4 MG capsule  Commonly known as: FLOMAX     timolol 0.25 % ophthalmic solution  Commonly known as: TIMOPTIC               Where to Get Your Medications        These medications were sent to [id number] - [place], [place] - [address] - P [phone] - F [phone] [address] [place] [address]      Phone: [phone]   aspirin 81 MG chewable tablet          Severe aortic stenosis s/p TAVR 3//25  Mild CAD on recent left heart cath  Chronic atrial fibrillation, on Eliquis at home  Hx of DVT in the past  Chronic HTN    Pt seen and examined.  Pt resting in bed.  He denies any chest pain or shortness of breath.  TAVR site soft.    Discussed with patient and nursing. Medications and discharge instructions reviewed with patient and nursing. Pt verbalizes understanding regarding medications and activity restrictions.  Plan for eliquis and ASA on discharge. Will follow up in 2 weeks.       Electronically signed by XAVIER BLANDON CNP on 3/6/2025 at 9:06 AM  [facility]      [phone]

## 2025-03-06 NOTE — PLAN OF CARE
Problem: Discharge Planning  Goal: Discharge to home or other facility with appropriate resources  3/5/2025 2146 by Lisa Lucero RN  Outcome: Progressing  3/5/2025 1755 by Mariposa Arellano RN  Outcome: Progressing     Problem: Pain  Goal: Verbalizes/displays adequate comfort level or baseline comfort level  3/5/2025 2146 by Lisa Lucero RN  Outcome: Progressing  3/5/2025 1755 by Mariposa Arellano RN  Outcome: Progressing     Problem: Safety - Adult  Goal: Free from fall injury  3/5/2025 2146 by Lisa Lucero RN  Outcome: Progressing  3/5/2025 1755 by Mariposa Arellano RN  Outcome: Progressing     Problem: Neurosensory - Adult  Goal: Achieves stable or improved neurological status  Outcome: Progressing  Goal: Absence of seizures  Outcome: Progressing  Goal: Remains free of injury related to seizures activity  Outcome: Progressing  Goal: Achieves maximal functionality and self care  Outcome: Progressing     Problem: Respiratory - Adult  Goal: Achieves optimal ventilation and oxygenation  Outcome: Progressing     Problem: Cardiovascular - Adult  Goal: Maintains optimal cardiac output and hemodynamic stability  Outcome: Progressing  Goal: Absence of cardiac dysrhythmias or at baseline  Outcome: Progressing     Problem: Skin/Tissue Integrity - Adult  Goal: Skin integrity remains intact  Outcome: Progressing  Goal: Incisions, wounds, or drain sites healing without S/S of infection  Outcome: Progressing  Goal: Oral mucous membranes remain intact  Outcome: Progressing     Problem: Musculoskeletal - Adult  Goal: Return mobility to safest level of function  Outcome: Progressing  Goal: Maintain proper alignment of affected body part  Outcome: Progressing  Goal: Return ADL status to a safe level of function  Outcome: Progressing     Problem: Gastrointestinal - Adult  Goal: Minimal or absence of nausea and vomiting  Outcome: Progressing  Goal: Maintains or returns to baseline bowel function  Outcome: Progressing  Goal: Maintains

## 2025-03-06 NOTE — CARE COORDINATION
Case Management   Daily Progress Note       Patient Name: Grupo Pérez                   YOB: 1940  Diagnosis: Aortic valve stenosis, etiology of cardiac valve disease unspecified [I35.0]  S/P TAVR (transcatheter aortic valve replacement) [Z95.2]                       GMLOS: 1.3 days  Length of Stay: 2  days    Anticipated Discharge Date: Ready for discharge    Readmission Risk (Low < 19, Mod (19-27), High > 27): Readmission Risk Score: 11.4      Patient Transitional Goal: Home    Current Transitional Plan    [x] Home Independently    [] Home with HC    [] Skilled Nursing Facility    [] Acute Rehabilitation    [] Long Term Acute Care (LTAC)    [] Other:     Plan for the Stay (Medical Management):           Workflow Continuation (Additional Notes):     1220: CM hand faxed external Cardiac Rehab Referral to [facility]. Plan remains home with wife, and family for transport.       Rekha Esparza RN  March 6, 2025

## 2025-03-11 ENCOUNTER — FOLLOWUP TELEPHONE ENCOUNTER (OUTPATIENT)
Age: 85
End: 2025-03-11

## 2025-03-11 NOTE — TELEPHONE ENCOUNTER
Wife calls in stating Dr. Suarez ordered a CT of the abdomen and pelvis asking if he already had this done as part of his TAVR work-up.  I spoke fred Fernandez at Dr. Suarez's office and clarified that is for abdomen/pelvis w a run off.  RN called the wife back and relayed the information. Wife V/U

## 2025-03-21 ENCOUNTER — HOSPITAL ENCOUNTER (OUTPATIENT)
Age: 85
Setting detail: SPECIMEN
Discharge: HOME | End: 2025-03-21

## 2025-03-21 DIAGNOSIS — Z95.2 S/P TAVR (TRANSCATHETER AORTIC VALVE REPLACEMENT): ICD-10-CM

## 2025-03-21 LAB
ANION GAP SERPL CALCULATED.3IONS-SCNC: 14 MMOL/L (ref 9–16)
BUN SERPL-MCNC: 79 MG/DL (ref 8–23)
CALCIUM SERPL-MCNC: 9.2 MG/DL (ref 8.6–10.4)
CHLORIDE SERPL-SCNC: 107 MMOL/L (ref 98–107)
CO2 SERPL-SCNC: 22 MMOL/L (ref 20–31)
CREAT SERPL-MCNC: 2 MG/DL (ref 0.7–1.2)
ERYTHROCYTE [DISTWIDTH] IN BLOOD BY AUTOMATED COUNT: 18.7 % (ref 11.8–14.4)
GFR, ESTIMATED: 33 ML/MIN/1.73M2
GLUCOSE SERPL-MCNC: 108 MG/DL (ref 74–99)
HCT VFR BLD AUTO: 26.6 % (ref 40.7–50.3)
HGB BLD-MCNC: 8.2 G/DL (ref 13–17)
MCH RBC QN AUTO: 31.4 PG (ref 25.2–33.5)
MCHC RBC AUTO-ENTMCNC: 30.8 G/DL (ref 28.4–34.8)
MCV RBC AUTO: 101.9 FL (ref 82.6–102.9)
NRBC BLD-RTO: 0 PER 100 WBC
PLATELET # BLD AUTO: 198 K/UL (ref 138–453)
PMV BLD AUTO: 10.2 FL (ref 8.1–13.5)
POTASSIUM SERPL-SCNC: 5.3 MMOL/L (ref 3.7–5.3)
RBC # BLD AUTO: 2.61 M/UL (ref 4.21–5.77)
SODIUM SERPL-SCNC: 143 MMOL/L (ref 136–145)
WBC OTHER # BLD: 6.2 K/UL (ref 3.5–11.3)

## 2025-03-25 ENCOUNTER — APPOINTMENT (OUTPATIENT)
Dept: GENERAL RADIOLOGY | Facility: CLINIC | Age: 85
DRG: 280 | End: 2025-03-25
Payer: COMMERCIAL

## 2025-03-25 ENCOUNTER — HOSPITAL ENCOUNTER (INPATIENT)
Age: 85
LOS: 6 days | Discharge: HOME | DRG: 280 | End: 2025-03-31
Attending: EMERGENCY MEDICINE | Admitting: INTERNAL MEDICINE
Payer: COMMERCIAL

## 2025-03-25 DIAGNOSIS — N17.9 ACUTE KIDNEY INJURY: ICD-10-CM

## 2025-03-25 DIAGNOSIS — I50.33 ACUTE ON CHRONIC DIASTOLIC HEART FAILURE (HCC): ICD-10-CM

## 2025-03-25 DIAGNOSIS — I21.4 NSTEMI (NON-ST ELEVATED MYOCARDIAL INFARCTION) (HCC): Primary | ICD-10-CM

## 2025-03-25 PROBLEM — N40.0 BPH (BENIGN PROSTATIC HYPERPLASIA): Status: ACTIVE | Noted: 2025-03-25

## 2025-03-25 PROBLEM — I50.9 ACUTE DECOMPENSATED HEART FAILURE (HCC): Status: ACTIVE | Noted: 2025-03-25

## 2025-03-25 PROBLEM — D68.69 SECONDARY HYPERCOAGULABLE STATE: Status: ACTIVE | Noted: 2025-03-25

## 2025-03-25 LAB
ALBUMIN SERPL-MCNC: 3.7 G/DL (ref 3.5–5.2)
ALBUMIN/GLOB SERPL: 1.5 {RATIO}
ALP SERPL-CCNC: 61 U/L (ref 40–129)
ALT SERPL-CCNC: 11 U/L (ref 10–50)
ANION GAP SERPL CALCULATED.3IONS-SCNC: 11 MMOL/L (ref 9–16)
AST SERPL-CCNC: 37 U/L (ref 10–50)
BASOPHILS # BLD: 0.04 K/UL (ref 0–0.2)
BASOPHILS NFR BLD: 1 % (ref 0–2)
BILIRUB SERPL-MCNC: 0.9 MG/DL (ref 0–1.2)
BNP SERPL-MCNC: 4494 PG/ML (ref 0–300)
BUN SERPL-MCNC: 83 MG/DL (ref 8–23)
CALCIUM SERPL-MCNC: 8.9 MG/DL (ref 8.6–10.4)
CHLORIDE SERPL-SCNC: 107 MMOL/L (ref 98–107)
CO2 SERPL-SCNC: 22 MMOL/L (ref 20–31)
CREAT SERPL-MCNC: 1.9 MG/DL (ref 0.7–1.2)
EOSINOPHIL # BLD: 0.08 K/UL (ref 0–0.4)
EOSINOPHILS RELATIVE PERCENT: 2 % (ref 1–4)
ERYTHROCYTE [DISTWIDTH] IN BLOOD BY AUTOMATED COUNT: 19.9 % (ref 12.5–15.4)
FERRITIN SERPL-MCNC: 215 NG/ML
GFR, ESTIMATED: 35 ML/MIN/1.73M2
GLUCOSE SERPL-MCNC: 107 MG/DL (ref 74–99)
HCT VFR BLD AUTO: 21.5 % (ref 41–53)
HCT VFR BLD AUTO: 22.1 % (ref 40.7–50.3)
HGB BLD-MCNC: 7.1 G/DL (ref 13–17)
HGB BLD-MCNC: 7.3 G/DL (ref 13.5–17.5)
INR PPP: 1.1
IRON SATN MFR SERPL: 25 % (ref 20–55)
IRON SERPL-MCNC: 70 UG/DL (ref 61–157)
LYMPHOCYTES NFR BLD: 0.21 K/UL (ref 1–4.8)
LYMPHOCYTES RELATIVE PERCENT: 5 % (ref 24–44)
MCH RBC QN AUTO: 33.4 PG (ref 26–34)
MCHC RBC AUTO-ENTMCNC: 34 G/DL (ref 31–37)
MCV RBC AUTO: 98.2 FL (ref 80–100)
MONOCYTES NFR BLD: 0.71 K/UL (ref 0.1–0.8)
MONOCYTES NFR BLD: 17 % (ref 1–7)
MORPHOLOGY: ABNORMAL
NEUTROPHILS NFR BLD: 75 % (ref 36–66)
NEUTS SEG NFR BLD: 3.16 K/UL (ref 1.8–7.7)
PLATELET # BLD AUTO: 135 K/UL (ref 140–450)
PMV BLD AUTO: 7.6 FL (ref 6–12)
POTASSIUM SERPL-SCNC: 4.6 MMOL/L (ref 3.7–5.3)
PROT SERPL-MCNC: 6.2 G/DL (ref 6.6–8.7)
PROTHROMBIN TIME: 11.6 SEC (ref 9.4–12.6)
RBC # BLD AUTO: 2.19 M/UL (ref 4.5–5.9)
SODIUM SERPL-SCNC: 140 MMOL/L (ref 136–145)
TIBC SERPL-MCNC: 284 UG/DL (ref 250–450)
TROPONIN I SERPL HS-MCNC: 68 NG/L (ref 0–22)
TROPONIN I SERPL HS-MCNC: 73 NG/L (ref 0–22)
TSH SERPL DL<=0.05 MIU/L-ACNC: 2.65 UIU/ML (ref 0.27–4.2)
TSH SERPL DL<=0.05 MIU/L-ACNC: 3.24 UIU/ML (ref 0.27–4.2)
UNSATURATED IRON BINDING CAPACITY: 214 UG/DL (ref 112–347)
WBC OTHER # BLD: 4.2 K/UL (ref 3.5–11)

## 2025-03-25 PROCEDURE — 6360000002 HC RX W HCPCS

## 2025-03-25 PROCEDURE — 80053 COMPREHEN METABOLIC PANEL: CPT

## 2025-03-25 PROCEDURE — 85610 PROTHROMBIN TIME: CPT

## 2025-03-25 PROCEDURE — 6360000002 HC RX W HCPCS: Performed by: NURSE PRACTITIONER

## 2025-03-25 PROCEDURE — 83540 ASSAY OF IRON: CPT

## 2025-03-25 PROCEDURE — 6370000000 HC RX 637 (ALT 250 FOR IP)

## 2025-03-25 PROCEDURE — 2000000000 HC ICU R&B

## 2025-03-25 PROCEDURE — 71045 X-RAY EXAM CHEST 1 VIEW: CPT

## 2025-03-25 PROCEDURE — 2500000003 HC RX 250 WO HCPCS

## 2025-03-25 PROCEDURE — 83550 IRON BINDING TEST: CPT

## 2025-03-25 PROCEDURE — 84443 ASSAY THYROID STIM HORMONE: CPT

## 2025-03-25 PROCEDURE — 85018 HEMOGLOBIN: CPT

## 2025-03-25 PROCEDURE — 85014 HEMATOCRIT: CPT

## 2025-03-25 PROCEDURE — 99223 1ST HOSP IP/OBS HIGH 75: CPT

## 2025-03-25 PROCEDURE — 83880 ASSAY OF NATRIURETIC PEPTIDE: CPT

## 2025-03-25 PROCEDURE — 82728 ASSAY OF FERRITIN: CPT

## 2025-03-25 PROCEDURE — 84484 ASSAY OF TROPONIN QUANT: CPT

## 2025-03-25 PROCEDURE — 99285 EMERGENCY DEPT VISIT HI MDM: CPT

## 2025-03-25 PROCEDURE — 93005 ELECTROCARDIOGRAM TRACING: CPT | Performed by: NURSE PRACTITIONER

## 2025-03-25 PROCEDURE — 85025 COMPLETE CBC W/AUTO DIFF WBC: CPT

## 2025-03-25 PROCEDURE — 36415 COLL VENOUS BLD VENIPUNCTURE: CPT

## 2025-03-25 RX ORDER — ACETAMINOPHEN 650 MG/1
650 SUPPOSITORY RECTAL EVERY 6 HOURS PRN
Status: DISCONTINUED | OUTPATIENT
Start: 2025-03-25 | End: 2025-03-31 | Stop reason: HOSPADM

## 2025-03-25 RX ORDER — FUROSEMIDE 10 MG/ML
40 INJECTION INTRAMUSCULAR; INTRAVENOUS 2 TIMES DAILY
Status: DISCONTINUED | OUTPATIENT
Start: 2025-03-25 | End: 2025-03-27

## 2025-03-25 RX ORDER — POTASSIUM CHLORIDE 1500 MG/1
40 TABLET, EXTENDED RELEASE ORAL PRN
Status: DISCONTINUED | OUTPATIENT
Start: 2025-03-25 | End: 2025-03-31 | Stop reason: HOSPADM

## 2025-03-25 RX ORDER — AMLODIPINE BESYLATE 5 MG/1
5 TABLET ORAL DAILY
Status: DISCONTINUED | OUTPATIENT
Start: 2025-03-25 | End: 2025-03-28

## 2025-03-25 RX ORDER — MAGNESIUM SULFATE IN WATER 40 MG/ML
2000 INJECTION, SOLUTION INTRAVENOUS PRN
Status: DISCONTINUED | OUTPATIENT
Start: 2025-03-25 | End: 2025-03-31 | Stop reason: HOSPADM

## 2025-03-25 RX ORDER — TIMOLOL MALEATE 5 MG/ML
1 SOLUTION/ DROPS OPHTHALMIC DAILY
Status: DISCONTINUED | OUTPATIENT
Start: 2025-03-25 | End: 2025-03-31 | Stop reason: HOSPADM

## 2025-03-25 RX ORDER — ACETAMINOPHEN 325 MG/1
650 TABLET ORAL EVERY 6 HOURS PRN
Status: DISCONTINUED | OUTPATIENT
Start: 2025-03-25 | End: 2025-03-31 | Stop reason: HOSPADM

## 2025-03-25 RX ORDER — ONDANSETRON 4 MG/1
4 TABLET, ORALLY DISINTEGRATING ORAL EVERY 8 HOURS PRN
Status: DISCONTINUED | OUTPATIENT
Start: 2025-03-25 | End: 2025-03-31 | Stop reason: HOSPADM

## 2025-03-25 RX ORDER — ONDANSETRON 2 MG/ML
4 INJECTION INTRAMUSCULAR; INTRAVENOUS EVERY 6 HOURS PRN
Status: DISCONTINUED | OUTPATIENT
Start: 2025-03-25 | End: 2025-03-31 | Stop reason: HOSPADM

## 2025-03-25 RX ORDER — POLYETHYLENE GLYCOL 3350 17 G/17G
17 POWDER, FOR SOLUTION ORAL DAILY PRN
Status: DISCONTINUED | OUTPATIENT
Start: 2025-03-25 | End: 2025-03-31 | Stop reason: HOSPADM

## 2025-03-25 RX ORDER — SODIUM CHLORIDE 0.9 % (FLUSH) 0.9 %
5-40 SYRINGE (ML) INJECTION EVERY 12 HOURS SCHEDULED
Status: DISCONTINUED | OUTPATIENT
Start: 2025-03-25 | End: 2025-03-31 | Stop reason: HOSPADM

## 2025-03-25 RX ORDER — SODIUM CHLORIDE 9 MG/ML
INJECTION, SOLUTION INTRAVENOUS PRN
Status: DISCONTINUED | OUTPATIENT
Start: 2025-03-25 | End: 2025-03-31 | Stop reason: HOSPADM

## 2025-03-25 RX ORDER — ASPIRIN 81 MG/1
81 TABLET, CHEWABLE ORAL DAILY
Status: DISCONTINUED | OUTPATIENT
Start: 2025-03-25 | End: 2025-03-31 | Stop reason: HOSPADM

## 2025-03-25 RX ORDER — ATORVASTATIN CALCIUM 10 MG/1
10 TABLET, FILM COATED ORAL DAILY
Status: DISCONTINUED | OUTPATIENT
Start: 2025-03-25 | End: 2025-03-31 | Stop reason: HOSPADM

## 2025-03-25 RX ORDER — SODIUM CHLORIDE 0.9 % (FLUSH) 0.9 %
5-40 SYRINGE (ML) INJECTION PRN
Status: DISCONTINUED | OUTPATIENT
Start: 2025-03-25 | End: 2025-03-31 | Stop reason: HOSPADM

## 2025-03-25 RX ORDER — POTASSIUM CHLORIDE 7.45 MG/ML
10 INJECTION INTRAVENOUS PRN
Status: DISCONTINUED | OUTPATIENT
Start: 2025-03-25 | End: 2025-03-31 | Stop reason: HOSPADM

## 2025-03-25 RX ORDER — FUROSEMIDE 10 MG/ML
40 INJECTION INTRAMUSCULAR; INTRAVENOUS ONCE
Status: COMPLETED | OUTPATIENT
Start: 2025-03-25 | End: 2025-03-25

## 2025-03-25 RX ORDER — TAMSULOSIN HYDROCHLORIDE 0.4 MG/1
0.4 CAPSULE ORAL DAILY
Status: DISCONTINUED | OUTPATIENT
Start: 2025-03-25 | End: 2025-03-31 | Stop reason: HOSPADM

## 2025-03-25 RX ORDER — OXYCODONE AND ACETAMINOPHEN 5; 325 MG/1; MG/1
1 TABLET ORAL EVERY 6 HOURS PRN
Status: DISCONTINUED | OUTPATIENT
Start: 2025-03-25 | End: 2025-03-26

## 2025-03-25 RX ADMIN — SODIUM CHLORIDE, PRESERVATIVE FREE 10 ML: 5 INJECTION INTRAVENOUS at 19:37

## 2025-03-25 RX ADMIN — OXYCODONE HYDROCHLORIDE AND ACETAMINOPHEN 1 TABLET: 5; 325 TABLET ORAL at 23:23

## 2025-03-25 RX ADMIN — FUROSEMIDE 40 MG: 10 INJECTION, SOLUTION INTRAMUSCULAR; INTRAVENOUS at 19:37

## 2025-03-25 RX ADMIN — ATORVASTATIN CALCIUM 10 MG: 10 TABLET, FILM COATED ORAL at 19:37

## 2025-03-25 RX ADMIN — FUROSEMIDE 40 MG: 10 INJECTION, SOLUTION INTRAMUSCULAR; INTRAVENOUS at 14:49

## 2025-03-25 ASSESSMENT — PAIN - FUNCTIONAL ASSESSMENT: PAIN_FUNCTIONAL_ASSESSMENT: NONE - DENIES PAIN

## 2025-03-25 ASSESSMENT — PAIN DESCRIPTION - LOCATION: LOCATION: BACK

## 2025-03-25 ASSESSMENT — PAIN DESCRIPTION - DESCRIPTORS: DESCRIPTORS: ACHING

## 2025-03-25 ASSESSMENT — PAIN DESCRIPTION - ORIENTATION: ORIENTATION: POSTERIOR

## 2025-03-25 ASSESSMENT — PAIN SCALES - GENERAL
PAINLEVEL_OUTOF10: 0
PAINLEVEL_OUTOF10: 3
PAINLEVEL_OUTOF10: 9

## 2025-03-25 NOTE — H&P
[facility]  Office: [phone]  Jorge Loredo DO, Magnus Rivers DO, Bubba Graham DO, Hector Butler DO, Joey Espitia MD, Loraine Torres MD, Natalie Uriarte MD, Estrellita Rice MD,  James Wilhelm MD, Maddie Mccloud MD, Ryan Hu MD,  Zelda Armenta DO, Beverley Ardon MD, Asa Rocha MD, Sravan Loredo DO, Elizabeth Alegre MD,  Rik Shah DO, Navya Thompson MD, Moni Riley MD, Eve Sue MD,  David Edwards MD, Evan Giron MD, Aidee Deleon MD, Cherise Vallejo MD, Godwin Chung MD, Reggie Padgett MD, Vignesh Tran DO, Jarod Padron MD, Zelda Welch MD, Mohsin Reza, MD, Shirley Waterhouse, CNP,  Mary Dumont, CNP, Vignesh Cooper, CNP,  Nuha Wong, DNP, Ana María Anne, CNP, Angy Carrillo, CNP, Danyelle Storey, CNP, Cristina Florentino, CNP, Juany Schmid PA-C, Shilpa Munoz, CNP, Angel Valdivia, CNP,  Emmy Alonso, CNP, Tara Toro, CNP, Olga Bashir, CNP,  Avis Fall, CNS, Krystina Pradhan, CNP, Rylie Leger, CNP,   Fiorella Chance, CNP         [facility]   IN-PATIENT SERVICE   [facility]    HISTORY AND PHYSICAL EXAMINATION            Date:   3/25/2025  Patient name:  Grupo Pérez  Date of admission:  3/25/2025 12:42 PM  MRN:   [id number]  Account:  [id number]  YOB: 1940  PCP:    Roger Mcmullen MD  Room:   1104/1104-01  Code Status:    Full Code    Chief Complaint:     Chief Complaint   Patient presents with    Abnormal Lab     History Obtained From:     patient, family member - daughter, electronic medical record    History of Present Illness:     Grupo Pérez is a 84 y.o. Non- / non  male who presents with Abnormal Lab   and is admitted to the hospital for the management of Acute decompensated heart failure (HCC).    This 84-year-old male is admitted for management of acute decompensated heart failure.  He has a very complex cardiac history, including recent admission at [facility]

## 2025-03-25 NOTE — CONSENT
Informed Consent for Blood Component Transfusion Note    I have discussed with the patient and daughter the rationale for blood component transfusion; its benefits in treating or preventing fatigue, organ damage, or death; and its risk which includes mild transfusion reactions, rare risk of blood borne infection, or more serious but rare reactions. I have discussed the alternatives to transfusion, including the risk and consequences of not receiving transfusion. The patient and daughter had an opportunity to ask questions and had agreed to proceed with transfusion of blood components.    Electronically signed by ROSETTE Elizabeth on 3/25/25 at 6:29 PM EDT

## 2025-03-25 NOTE — ED PROVIDER NOTES
[facility]  [address] [address]  Phone: [phone]      Attending Physician Attestation          CHIEF COMPLAINT       Chief Complaint   Patient presents with    Abnormal Lab       DIAGNOSTIC RESULTS     LABS:  Labs Reviewed   CBC WITH AUTO DIFFERENTIAL   COMPREHENSIVE METABOLIC PANEL   TROPONIN   PROTIME-INR   BRAIN NATRIURETIC PEPTIDE   TROPONIN   TSH       All other labs were within normal range or not returned as of this dictation.    RADIOLOGY:  XR CHEST PORTABLE    (Results Pending)         EMERGENCY DEPARTMENT COURSE:   Vitals:    Vitals:    03/25/25 1247   Pulse: 62   Resp: 18   Temp: 97.6 °F (36.4 °C)   TempSrc: Oral   SpO2: 96%   Weight: 82.6 kg (182 lb)   Height: 1.803 m (5' 11\")     -------------------------   , Temp: 97.6 °F (36.4 °C), Pulse: 62, Respirations: 18             PERTINENT ATTENDING PHYSICIAN COMMENTS:    I performed a history and physical examination of the patient and discussed management with the mid level provider. I reviewed the mid level provider's note and agree with the documented findings and plan of care. Any areas of disagreement are noted on the chart. I was personally present for the key portions of any procedures. I have documented in the chart those procedures where I was not present during the key portions. I have reviewed the emergency nurses triage note. I agree with the chief complaint, past medical history, past surgical history, allergies, medications, social and family history as documented unless otherwise noted below. Documentation of the HPI, Physical Exam and Medical Decision Making performed by mid level providers is based on my personal performance of the HPI, PE and MDM. For Residents, Physician Assistant/ Nurse Practitioner cases/documentation I have personally evaluated this patient and have completed at least one if not all key elements of the E/M (history, physical exam, and MDM). Additional findings are as 
Mental Status: He is alert and oriented to person, place, and time.      Cranial Nerves: No cranial nerve deficit.      Motor: No weakness.   Psychiatric:         Mood and Affect: Mood normal.         Behavior: Behavior normal.         DIAGNOSTIC RESULTS     EKG: All EKG's are interpreted by the Emergency Department Physician who either signs or Co-signs this chart in the absence of a cardiologist.      RADIOLOGY:   Non-plain film images such as CT, Ultrasound and MRI are read by the radiologist. Plain radiographic images are visualized and preliminarily interpreted by the emergency physician with the below findings:      Interpretation per the Radiologist below, if available at the time of this note:    XR CHEST PORTABLE   Final Result   1. Small bilateral pleural effusions.   2. Prominent cardiomegaly.               ED BEDSIDE ULTRASOUND:   Performed by ED Physician - none    LABS:  Labs Reviewed   CBC WITH AUTO DIFFERENTIAL - Abnormal; Notable for the following components:       Result Value    RBC 2.19 (*)     Hemoglobin 7.3 (*)     Hematocrit 21.5 (*)     RDW 19.9 (*)     Platelets 135 (*)     Neutrophils % 75 (*)     Lymphocytes % 5 (*)     Monocytes % 17 (*)     Lymphocytes Absolute 0.21 (*)     All other components within normal limits   COMPREHENSIVE METABOLIC PANEL - Abnormal; Notable for the following components:    Glucose 107 (*)     BUN 83 (*)     Creatinine 1.9 (*)     Est, Glom Filt Rate 35 (*)     Total Protein 6.2 (*)     All other components within normal limits   BRAIN NATRIURETIC PEPTIDE - Abnormal; Notable for the following components:    NT Pro-BNP 4,494 (*)     All other components within normal limits   TROPONIN - Abnormal; Notable for the following components:    Troponin, High Sensitivity 73 (*)     All other components within normal limits   PROTIME-INR   TROPONIN   TSH       All other labs were within normal range or not returned as of this dictation.    EMERGENCY DEPARTMENT COURSE and

## 2025-03-26 PROBLEM — I50.33 ACUTE ON CHRONIC DIASTOLIC HEART FAILURE (HCC): Status: ACTIVE | Noted: 2025-03-26

## 2025-03-26 PROBLEM — I21.4 NSTEMI (NON-ST ELEVATED MYOCARDIAL INFARCTION) (HCC): Status: ACTIVE | Noted: 2025-03-26

## 2025-03-26 LAB
ABO/RH: NORMAL
ALBUMIN SERPL-MCNC: 3.3 G/DL (ref 3.5–5.2)
ALBUMIN/GLOB SERPL: 1.5 {RATIO} (ref 1–2.5)
ALP SERPL-CCNC: 58 U/L (ref 40–129)
ALT SERPL-CCNC: 9 U/L (ref 10–50)
ANION GAP SERPL CALCULATED.3IONS-SCNC: 12 MMOL/L (ref 9–16)
ANTIBODY SCREEN: NEGATIVE
ARM BAND NUMBER: NORMAL
AST SERPL-CCNC: 31 U/L (ref 10–50)
BILIRUB DIRECT SERPL-MCNC: 0.4 MG/DL (ref 0–0.2)
BILIRUB INDIRECT SERPL-MCNC: 0.4 MG/DL
BILIRUB SERPL-MCNC: 0.8 MG/DL (ref 0–1.2)
BLOOD BANK DISPENSE STATUS: NORMAL
BLOOD BANK SAMPLE EXPIRATION: NORMAL
BPU ID: NORMAL
BUN SERPL-MCNC: 78 MG/DL (ref 8–23)
CALCIUM SERPL-MCNC: 8.4 MG/DL (ref 8.8–10.2)
CHLORIDE SERPL-SCNC: 106 MMOL/L (ref 98–107)
CHOLEST SERPL-MCNC: 98 MG/DL (ref 0–199)
CHOLESTEROL/HDL RATIO: 2
CO2 SERPL-SCNC: 19 MMOL/L (ref 20–31)
COMPONENT: NORMAL
CREAT SERPL-MCNC: 1.9 MG/DL (ref 0.7–1.2)
CROSSMATCH RESULT: NORMAL
FOLATE SERPL-MCNC: 6.6 NG/ML (ref 4.8–24.2)
GFR, ESTIMATED: 35 ML/MIN/1.73M2
GLUCOSE SERPL-MCNC: 107 MG/DL (ref 82–115)
HCT VFR BLD AUTO: 20.3 % (ref 40.7–50.3)
HCT VFR BLD AUTO: 22.7 % (ref 40.7–50.3)
HCT VFR BLD AUTO: 24.4 % (ref 40.7–50.3)
HDLC SERPL-MCNC: 48 MG/DL
HGB BLD-MCNC: 6.6 G/DL (ref 13–17)
HGB BLD-MCNC: 7.4 G/DL (ref 13–17)
HGB BLD-MCNC: 7.9 G/DL (ref 13–17)
LDLC SERPL CALC-MCNC: 41 MG/DL (ref 0–100)
MAGNESIUM SERPL-MCNC: 2.6 MG/DL (ref 1.6–2.4)
POTASSIUM SERPL-SCNC: 4.9 MMOL/L (ref 3.7–5.3)
PROT SERPL-MCNC: 5.5 G/DL (ref 6.6–8.7)
SODIUM SERPL-SCNC: 137 MMOL/L (ref 136–145)
TRANSFUSION STATUS: NORMAL
TRIGL SERPL-MCNC: 47 MG/DL
UNIT DIVISION: 0
VIT B12 SERPL-MCNC: 315 PG/ML (ref 232–1245)
VLDLC SERPL CALC-MCNC: 9 MG/DL (ref 1–30)

## 2025-03-26 PROCEDURE — 82746 ASSAY OF FOLIC ACID SERUM: CPT

## 2025-03-26 PROCEDURE — 80061 LIPID PANEL: CPT

## 2025-03-26 PROCEDURE — 6370000000 HC RX 637 (ALT 250 FOR IP)

## 2025-03-26 PROCEDURE — 97162 PT EVAL MOD COMPLEX 30 MIN: CPT

## 2025-03-26 PROCEDURE — 2500000003 HC RX 250 WO HCPCS

## 2025-03-26 PROCEDURE — 97530 THERAPEUTIC ACTIVITIES: CPT

## 2025-03-26 PROCEDURE — 80076 HEPATIC FUNCTION PANEL: CPT

## 2025-03-26 PROCEDURE — 86901 BLOOD TYPING SEROLOGIC RH(D): CPT

## 2025-03-26 PROCEDURE — 86900 BLOOD TYPING SEROLOGIC ABO: CPT

## 2025-03-26 PROCEDURE — 99232 SBSQ HOSP IP/OBS MODERATE 35: CPT | Performed by: INTERNAL MEDICINE

## 2025-03-26 PROCEDURE — P9040 RBC LEUKOREDUCED IRRADIATED: HCPCS

## 2025-03-26 PROCEDURE — 36430 TRANSFUSION BLD/BLD COMPNT: CPT

## 2025-03-26 PROCEDURE — 36415 COLL VENOUS BLD VENIPUNCTURE: CPT

## 2025-03-26 PROCEDURE — 86850 RBC ANTIBODY SCREEN: CPT

## 2025-03-26 PROCEDURE — 99223 1ST HOSP IP/OBS HIGH 75: CPT | Performed by: NURSE PRACTITIONER

## 2025-03-26 PROCEDURE — 86920 COMPATIBILITY TEST SPIN: CPT

## 2025-03-26 PROCEDURE — 80048 BASIC METABOLIC PNL TOTAL CA: CPT

## 2025-03-26 PROCEDURE — 6360000002 HC RX W HCPCS

## 2025-03-26 PROCEDURE — 97166 OT EVAL MOD COMPLEX 45 MIN: CPT

## 2025-03-26 PROCEDURE — 85018 HEMOGLOBIN: CPT

## 2025-03-26 PROCEDURE — 2000000000 HC ICU R&B

## 2025-03-26 PROCEDURE — 82607 VITAMIN B-12: CPT

## 2025-03-26 PROCEDURE — 83735 ASSAY OF MAGNESIUM: CPT

## 2025-03-26 PROCEDURE — 85014 HEMATOCRIT: CPT

## 2025-03-26 PROCEDURE — 6370000000 HC RX 637 (ALT 250 FOR IP): Performed by: INTERNAL MEDICINE

## 2025-03-26 PROCEDURE — 94761 N-INVAS EAR/PLS OXIMETRY MLT: CPT

## 2025-03-26 RX ORDER — MORPHINE SULFATE 2 MG/ML
1 INJECTION, SOLUTION INTRAMUSCULAR; INTRAVENOUS EVERY 4 HOURS PRN
Status: DISCONTINUED | OUTPATIENT
Start: 2025-03-26 | End: 2025-03-31 | Stop reason: HOSPADM

## 2025-03-26 RX ORDER — FUROSEMIDE 10 MG/ML
20 INJECTION INTRAMUSCULAR; INTRAVENOUS SEE ADMIN INSTRUCTIONS
Status: COMPLETED | OUTPATIENT
Start: 2025-03-26 | End: 2025-03-26

## 2025-03-26 RX ORDER — OXYCODONE AND ACETAMINOPHEN 5; 325 MG/1; MG/1
1 TABLET ORAL EVERY 6 HOURS PRN
Refills: 0 | Status: DISCONTINUED | OUTPATIENT
Start: 2025-03-26 | End: 2025-03-30

## 2025-03-26 RX ORDER — SODIUM CHLORIDE 9 MG/ML
INJECTION, SOLUTION INTRAVENOUS PRN
Status: DISCONTINUED | OUTPATIENT
Start: 2025-03-26 | End: 2025-03-31 | Stop reason: HOSPADM

## 2025-03-26 RX ORDER — DOCUSATE SODIUM 100 MG/1
100 CAPSULE, LIQUID FILLED ORAL NIGHTLY
COMMUNITY

## 2025-03-26 RX ORDER — LIDOCAINE 4 G/G
1 PATCH TOPICAL DAILY
Status: DISCONTINUED | OUTPATIENT
Start: 2025-03-26 | End: 2025-03-31 | Stop reason: HOSPADM

## 2025-03-26 RX ORDER — METHOCARBAMOL 750 MG/1
1500 TABLET, FILM COATED ORAL 3 TIMES DAILY PRN
Status: DISCONTINUED | OUTPATIENT
Start: 2025-03-26 | End: 2025-03-31 | Stop reason: HOSPADM

## 2025-03-26 RX ADMIN — SODIUM CHLORIDE, PRESERVATIVE FREE 10 ML: 5 INJECTION INTRAVENOUS at 19:38

## 2025-03-26 RX ADMIN — MORPHINE SULFATE 1 MG: 2 INJECTION, SOLUTION INTRAMUSCULAR; INTRAVENOUS at 07:20

## 2025-03-26 RX ADMIN — FUROSEMIDE 40 MG: 10 INJECTION, SOLUTION INTRAMUSCULAR; INTRAVENOUS at 17:58

## 2025-03-26 RX ADMIN — METHOCARBAMOL 1500 MG: 750 TABLET ORAL at 03:16

## 2025-03-26 RX ADMIN — FUROSEMIDE 20 MG: 10 INJECTION, SOLUTION INTRAMUSCULAR; INTRAVENOUS at 05:23

## 2025-03-26 RX ADMIN — Medication 12.5 MG: at 13:53

## 2025-03-26 RX ADMIN — FUROSEMIDE 40 MG: 10 INJECTION, SOLUTION INTRAMUSCULAR; INTRAVENOUS at 09:45

## 2025-03-26 RX ADMIN — ATORVASTATIN CALCIUM 10 MG: 10 TABLET, FILM COATED ORAL at 19:39

## 2025-03-26 RX ADMIN — OXYCODONE HYDROCHLORIDE AND ACETAMINOPHEN 1 TABLET: 5; 325 TABLET ORAL at 19:38

## 2025-03-26 RX ADMIN — TAMSULOSIN HYDROCHLORIDE 0.4 MG: 0.4 CAPSULE ORAL at 09:45

## 2025-03-26 RX ADMIN — MORPHINE SULFATE 1 MG: 2 INJECTION, SOLUTION INTRAMUSCULAR; INTRAVENOUS at 02:18

## 2025-03-26 RX ADMIN — METHOCARBAMOL 1500 MG: 750 TABLET ORAL at 13:53

## 2025-03-26 RX ADMIN — TIMOLOL MALEATE 1 DROP: 5 SOLUTION OPHTHALMIC at 09:46

## 2025-03-26 RX ADMIN — SODIUM CHLORIDE, PRESERVATIVE FREE 10 ML: 5 INJECTION INTRAVENOUS at 10:05

## 2025-03-26 RX ADMIN — OXYCODONE HYDROCHLORIDE AND ACETAMINOPHEN 1 TABLET: 5; 325 TABLET ORAL at 09:44

## 2025-03-26 RX ADMIN — MORPHINE SULFATE 1 MG: 2 INJECTION, SOLUTION INTRAMUSCULAR; INTRAVENOUS at 13:54

## 2025-03-26 ASSESSMENT — PAIN DESCRIPTION - DESCRIPTORS
DESCRIPTORS: CRUSHING
DESCRIPTORS: ACHING;CRUSHING;GNAWING
DESCRIPTORS: ACHING;DISCOMFORT
DESCRIPTORS: ACHING
DESCRIPTORS: CRUSHING

## 2025-03-26 ASSESSMENT — PAIN SCALES - GENERAL
PAINLEVEL_OUTOF10: 9
PAINLEVEL_OUTOF10: 0
PAINLEVEL_OUTOF10: 7
PAINLEVEL_OUTOF10: 8
PAINLEVEL_OUTOF10: 0
PAINLEVEL_OUTOF10: 6
PAINLEVEL_OUTOF10: 10
PAINLEVEL_OUTOF10: 6
PAINLEVEL_OUTOF10: 5
PAINLEVEL_OUTOF10: 8
PAINLEVEL_OUTOF10: 8
PAINLEVEL_OUTOF10: 6
PAINLEVEL_OUTOF10: 0

## 2025-03-26 ASSESSMENT — PAIN DESCRIPTION - ORIENTATION
ORIENTATION: POSTERIOR

## 2025-03-26 ASSESSMENT — PAIN DESCRIPTION - LOCATION
LOCATION: BACK

## 2025-03-26 ASSESSMENT — PAIN - FUNCTIONAL ASSESSMENT: PAIN_FUNCTIONAL_ASSESSMENT: ACTIVITIES ARE NOT PREVENTED

## 2025-03-26 NOTE — PLAN OF CARE
Problem: Pain  Goal: Verbalizes/displays adequate comfort level or baseline comfort level  Outcome: Progressing     Problem: ABCDS Injury Assessment  Goal: Absence of physical injury  Outcome: Progressing     Problem: Safety - Adult  Goal: Free from fall injury  Outcome: Progressing     Problem: Discharge Planning  Goal: Discharge to home or other facility with appropriate resources  Outcome: Progressing

## 2025-03-26 NOTE — CARE COORDINATION
03/26/25 1123   Readmission Assessment   Number of Days since last admission? 8-30 days   Previous Disposition Home with Family   Who is being Interviewed Patient   What was the patient's/caregiver's perception as to why they think they needed to return back to the hospital? Did not realize care needs would be so extensive   Did you visit your Primary Care Physician after you left the hospital, before you returned this time? Yes   Did you see a specialist, such as Cardiac, Pulmonary, Orthopedic Physician, etc. after you left the hospital? No   Who advised the patient to return to the hospital? Self-referral   Does the patient report anything that got in the way of taking their medications? No   In our efforts to provide the best possible care to you and others like you, can you think of anything that we could have done to help you after you left the hospital the first time, so that you might not have needed to return so soon? Discharge instructions that are concise, clear, and non contradictory

## 2025-03-26 NOTE — PLAN OF CARE
Problem: Discharge Planning  Goal: Discharge to home or other facility with appropriate resources  Outcome: Progressing  Flowsheets (Taken 3/26/2025 0800)  Discharge to home or other facility with appropriate resources: Identify barriers to discharge with patient and caregiver     Problem: Pain  Goal: Verbalizes/displays adequate comfort level or baseline comfort level  Outcome: Progressing     Problem: ABCDS Injury Assessment  Goal: Absence of physical injury  Outcome: Progressing     Problem: Safety - Adult  Goal: Free from fall injury  Outcome: Progressing

## 2025-03-26 NOTE — CARE COORDINATION
Case Management Assessment  Initial Evaluation    Date/Time of Evaluation: 3/26/2025 11:27 AM  Assessment Completed by: YAW CHAMBERS RN    If patient is discharged prior to next notation, then this note serves as note for discharge by case management.    Patient Name: Grupo Pérez                   YOB: 1940  Diagnosis: Acute on chronic diastolic heart failure (HCC) [I50.33]  NSTEMI (non-ST elevated myocardial infarction) (HCC) [I21.4]  Acute kidney injury [N17.9]  Acute decompensated heart failure (HCC) [I50.9]                   Date / Time: 3/25/2025 12:42 PM    Patient Admission Status: Inpatient   Readmission Risk (Low < 19, Mod (19-27), High > 27): Readmission Risk Score: 21.3    Current PCP: Roger Mcmullen MD  PCP verified by CM? Yes    Chart Reviewed: Yes      History Provided by: Patient  Patient Orientation: Alert and Oriented, Person, Place, Situation, Self    Patient Cognition: Alert    Hospitalization in the last 30 days (Readmission):  Yes    If yes, Readmission Assessment in  Navigator will be completed.    Advance Directives:      Code Status: Full Code   Patient's Primary Decision Maker is: Legal Next of Kin      Discharge Planning:    Patient lives with: Spouse/Significant Other, Children Type of Home: House  Primary Care Giver: Self  Patient Support Systems include: Spouse/Significant Other, Children   Current Financial resources: None  Current community resources: None  Current services prior to admission: Durable Medical Equipment            Current DME: Cane, Crutches, Walker            Type of Home Care services:  PT, OT, Skilled Therapy, Nursing Services    ADLS  Prior functional level: Independent in ADLs/IADLs  Current functional level: Assistance with the following:, Bathing, Toileting, Cooking, Housework, Shopping, Mobility    PT AM-PAC:   /24  OT AM-PAC:   /24    Family can provide assistance at DC: Yes  Would you like Case Management to discuss the discharge

## 2025-03-27 LAB
AMMONIA PLAS-SCNC: 20 UMOL/L (ref 16–60)
ANION GAP SERPL CALCULATED.3IONS-SCNC: 14 MMOL/L (ref 9–16)
BNP SERPL-MCNC: 4552 PG/ML (ref 0–450)
BUN SERPL-MCNC: 82 MG/DL (ref 8–23)
CALCIUM SERPL-MCNC: 8.5 MG/DL (ref 8.8–10.2)
CHLORIDE SERPL-SCNC: 106 MMOL/L (ref 98–107)
CO2 SERPL-SCNC: 19 MMOL/L (ref 20–31)
CREAT SERPL-MCNC: 2.3 MG/DL (ref 0.7–1.2)
CREAT UR-MCNC: 65.9 MG/DL (ref 39–259)
EKG ATRIAL RATE: 38 BPM
EKG Q-T INTERVAL: 472 MS
EKG QRS DURATION: 108 MS
EKG QTC CALCULATION (BAZETT): 455 MS
EKG R AXIS: -13 DEGREES
EKG T AXIS: 69 DEGREES
EKG VENTRICULAR RATE: 56 BPM
EOSINOPHIL,URINE: NORMAL
ERYTHROCYTE [DISTWIDTH] IN BLOOD BY AUTOMATED COUNT: 20.7 % (ref 11.8–14.4)
FREE KAPPA/LAMBDA RATIO: 1.43 (ref 0.22–1.74)
GFR, ESTIMATED: 28 ML/MIN/1.73M2
GLUCOSE SERPL-MCNC: 106 MG/DL (ref 82–115)
HCT VFR BLD AUTO: 23.8 % (ref 40.7–50.3)
HGB BLD-MCNC: 7.7 G/DL (ref 13–17)
KAPPA LC FREE SER-MCNC: 78 MG/L
LAMBDA LC FREE SERPL-MCNC: 54.5 MG/L (ref 4.2–27.7)
MAGNESIUM SERPL-MCNC: 2.7 MG/DL (ref 1.6–2.4)
MCH RBC QN AUTO: 32.6 PG (ref 25.2–33.5)
MCHC RBC AUTO-ENTMCNC: 32.4 G/DL (ref 28.4–34.8)
MCV RBC AUTO: 100.8 FL (ref 82.6–102.9)
NRBC BLD-RTO: 0 PER 100 WBC
PLATELET # BLD AUTO: 112 K/UL (ref 138–453)
PMV BLD AUTO: 10.3 FL (ref 8.1–13.5)
POTASSIUM SERPL-SCNC: 4.8 MMOL/L (ref 3.7–5.3)
RBC # BLD AUTO: 2.36 M/UL (ref 4.21–5.77)
SODIUM SERPL-SCNC: 139 MMOL/L (ref 136–145)
SODIUM UR-SCNC: 63 MMOL/L
TOTAL PROTEIN, URINE: <4 MG/DL
WBC OTHER # BLD: 4.7 K/UL (ref 3.5–11.3)

## 2025-03-27 PROCEDURE — 84300 ASSAY OF URINE SODIUM: CPT

## 2025-03-27 PROCEDURE — 6370000000 HC RX 637 (ALT 250 FOR IP): Performed by: NURSE PRACTITIONER

## 2025-03-27 PROCEDURE — 97162 PT EVAL MOD COMPLEX 30 MIN: CPT

## 2025-03-27 PROCEDURE — 83880 ASSAY OF NATRIURETIC PEPTIDE: CPT

## 2025-03-27 PROCEDURE — 83735 ASSAY OF MAGNESIUM: CPT

## 2025-03-27 PROCEDURE — 99233 SBSQ HOSP IP/OBS HIGH 50: CPT | Performed by: NURSE PRACTITIONER

## 2025-03-27 PROCEDURE — 2580000003 HC RX 258: Performed by: NURSE PRACTITIONER

## 2025-03-27 PROCEDURE — 36415 COLL VENOUS BLD VENIPUNCTURE: CPT

## 2025-03-27 PROCEDURE — 6360000002 HC RX W HCPCS: Performed by: NURSE PRACTITIONER

## 2025-03-27 PROCEDURE — 80048 BASIC METABOLIC PNL TOTAL CA: CPT

## 2025-03-27 PROCEDURE — 83521 IG LIGHT CHAINS FREE EACH: CPT

## 2025-03-27 PROCEDURE — 97530 THERAPEUTIC ACTIVITIES: CPT

## 2025-03-27 PROCEDURE — 6360000002 HC RX W HCPCS

## 2025-03-27 PROCEDURE — 87205 SMEAR GRAM STAIN: CPT

## 2025-03-27 PROCEDURE — 6370000000 HC RX 637 (ALT 250 FOR IP): Performed by: INTERNAL MEDICINE

## 2025-03-27 PROCEDURE — 82570 ASSAY OF URINE CREATININE: CPT

## 2025-03-27 PROCEDURE — 84156 ASSAY OF PROTEIN URINE: CPT

## 2025-03-27 PROCEDURE — 97166 OT EVAL MOD COMPLEX 45 MIN: CPT

## 2025-03-27 PROCEDURE — 97535 SELF CARE MNGMENT TRAINING: CPT

## 2025-03-27 PROCEDURE — 97168 OT RE-EVAL EST PLAN CARE: CPT

## 2025-03-27 PROCEDURE — 84165 PROTEIN E-PHORESIS SERUM: CPT

## 2025-03-27 PROCEDURE — 2500000003 HC RX 250 WO HCPCS

## 2025-03-27 PROCEDURE — 99232 SBSQ HOSP IP/OBS MODERATE 35: CPT | Performed by: INTERNAL MEDICINE

## 2025-03-27 PROCEDURE — 82140 ASSAY OF AMMONIA: CPT

## 2025-03-27 PROCEDURE — 84155 ASSAY OF PROTEIN SERUM: CPT

## 2025-03-27 PROCEDURE — 86334 IMMUNOFIX E-PHORESIS SERUM: CPT

## 2025-03-27 PROCEDURE — 6370000000 HC RX 637 (ALT 250 FOR IP)

## 2025-03-27 PROCEDURE — 85027 COMPLETE CBC AUTOMATED: CPT

## 2025-03-27 PROCEDURE — 6360000002 HC RX W HCPCS: Performed by: INTERNAL MEDICINE

## 2025-03-27 PROCEDURE — 2000000000 HC ICU R&B

## 2025-03-27 RX ORDER — FUROSEMIDE 10 MG/ML
20 INJECTION INTRAMUSCULAR; INTRAVENOUS 2 TIMES DAILY
Status: DISCONTINUED | OUTPATIENT
Start: 2025-03-27 | End: 2025-03-27

## 2025-03-27 RX ADMIN — METHOCARBAMOL 1500 MG: 750 TABLET ORAL at 05:25

## 2025-03-27 RX ADMIN — ATORVASTATIN CALCIUM 10 MG: 10 TABLET, FILM COATED ORAL at 19:28

## 2025-03-27 RX ADMIN — METHOCARBAMOL 1500 MG: 750 TABLET ORAL at 19:28

## 2025-03-27 RX ADMIN — SODIUM CHLORIDE 40 MG: 9 INJECTION INTRAMUSCULAR; INTRAVENOUS; SUBCUTANEOUS at 17:36

## 2025-03-27 RX ADMIN — Medication 3 MG: at 04:17

## 2025-03-27 RX ADMIN — SODIUM CHLORIDE, PRESERVATIVE FREE 10 ML: 5 INJECTION INTRAVENOUS at 10:13

## 2025-03-27 RX ADMIN — SODIUM CHLORIDE, PRESERVATIVE FREE 10 ML: 5 INJECTION INTRAVENOUS at 19:28

## 2025-03-27 RX ADMIN — POLYETHYLENE GLYCOL-3350 AND ELECTROLYTES 4000 ML: 236; 6.74; 5.86; 2.97; 22.74 POWDER, FOR SOLUTION ORAL at 17:34

## 2025-03-27 RX ADMIN — AMLODIPINE BESYLATE 5 MG: 5 TABLET ORAL at 09:38

## 2025-03-27 RX ADMIN — Medication 12.5 MG: at 09:38

## 2025-03-27 RX ADMIN — Medication 3 MG: at 19:28

## 2025-03-27 RX ADMIN — TIMOLOL MALEATE 1 DROP: 5 SOLUTION OPHTHALMIC at 09:41

## 2025-03-27 RX ADMIN — FUROSEMIDE 20 MG: 10 INJECTION, SOLUTION INTRAMUSCULAR; INTRAVENOUS at 09:41

## 2025-03-27 RX ADMIN — TAMSULOSIN HYDROCHLORIDE 0.4 MG: 0.4 CAPSULE ORAL at 09:39

## 2025-03-27 RX ADMIN — MORPHINE SULFATE 1 MG: 2 INJECTION, SOLUTION INTRAMUSCULAR; INTRAVENOUS at 05:26

## 2025-03-27 ASSESSMENT — PAIN SCALES - GENERAL
PAINLEVEL_OUTOF10: 9
PAINLEVEL_OUTOF10: 0
PAINLEVEL_OUTOF10: 0

## 2025-03-27 ASSESSMENT — PAIN DESCRIPTION - ORIENTATION: ORIENTATION: POSTERIOR

## 2025-03-27 ASSESSMENT — PAIN - FUNCTIONAL ASSESSMENT: PAIN_FUNCTIONAL_ASSESSMENT: PREVENTS OR INTERFERES SOME ACTIVE ACTIVITIES AND ADLS

## 2025-03-27 ASSESSMENT — PAIN DESCRIPTION - LOCATION: LOCATION: BACK

## 2025-03-27 ASSESSMENT — PAIN DESCRIPTION - DESCRIPTORS: DESCRIPTORS: ACHING

## 2025-03-27 NOTE — PLAN OF CARE
Problem: Discharge Planning  Goal: Discharge to home or other facility with appropriate resources  Outcome: Progressing  Flowsheets (Taken 3/27/2025 0800)  Discharge to home or other facility with appropriate resources: Identify barriers to discharge with patient and caregiver     Problem: Pain  Goal: Verbalizes/displays adequate comfort level or baseline comfort level  Outcome: Progressing     Problem: ABCDS Injury Assessment  Goal: Absence of physical injury  Outcome: Progressing     Problem: Safety - Adult  Goal: Free from fall injury  Outcome: Progressing

## 2025-03-27 NOTE — PLAN OF CARE
Problem: Pain  Goal: Verbalizes/displays adequate comfort level or baseline comfort level  3/26/2025 2055 by Arielle Vora RN  Outcome: Progressing     Problem: ABCDS Injury Assessment  Goal: Absence of physical injury  3/26/2025 2055 by Arielle Vora RN  Outcome: Progressing     Problem: Safety - Adult  Goal: Free from fall injury  3/26/2025 2055 by Arielle Vora RN  Outcome: Progressing     Problem: Discharge Planning  Goal: Discharge to home or other facility with appropriate resources  3/26/2025 2055 by Arielle Vora, RN  Outcome: Progressing

## 2025-03-27 NOTE — CONSULTS
GASTROENTEROLOGY CONSULT       REASON FOR CONSULT:  anemia, ? Gi bleed    REQUESTING PHYSICIAN:  Joey Espitia MD    HISTORY OF PRESENT ILLNESS:    The patient is a 84 y.o. male PMH diastolic heart failure, recent [facility]R at [facility] earlier this month, ECHO at that time showed EF 65% with severe tricuspid regurg. Patient recently had his diuretics adjusted outpatient due to increased BLE, increased abdominal girth, increased fatigue, SoB and recent black to dark stools. No bloody stools, no abdominal pain. Family felt he looked pale and hgb at admission was 7.3 down to 6.6 yesterday, he received a unit of blood now 7.7. No stools or bleeding per rectum today. His oral AC has been held. He is confused ? Not sure if baseline.  Would like to speak with his family via phone when able. Past endoscopy hx is unclear, but epic does note past EGD/colon. He is NPO pending my consult, I did allow clears with fluid restriction of 1800. No report of vomiting, patient claims he can eat well, but then this is not clear as he is focused on why he is not wearing pants.     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    Arthritis     Blood circulation, collateral     Bradycardia     CAD (coronary artery disease)     Former smoker     GERD (gastroesophageal reflux disease)     History of cardiac cath     Hyperlipidemia     Hypertension     S/P transesophageal echocardiogram (ESTEVAN)        SURGICAL HISTORY:  Past Surgical History:   Procedure Laterality Date    CARDIAC PROCEDURE N/A 02/05/2025    taleb / estevan/Left and right heart cath / coronary angiography performed by Vidhi Burns MD at [facility] CARDIAC CATH LAB    CARDIAC PROCEDURE N/A 02/05/2025    Estevan during cath case performed by Vidhi Burns MD at [facility] CARDIAC CATH LAB    CARDIAC PROCEDURE N/A 3/4/2025    jerome / 
[facility]    Consult                        Today's Date: 3/26/2025  Patient Name: Grupo Pérez  Date of admission: 3/25/2025 12:42 PM  Patient's age: 84 y.o., 1940  Admission Dx: Acute on chronic diastolic heart failure (HCC) [I50.33]  NSTEMI (non-ST elevated myocardial infarction) (HCC) [I21.4]  Acute kidney injury [N17.9]  Acute decompensated heart failure (HCC) [I50.9]    Reason for Consult:  Cardiac evaluation    Requesting Physician: Joey Espitia MD    CHIEF COMPLAINT:  SOB, CHF exacerbation    History Obtained From:  patient, electronic medical record    HISTORY OF PRESENT ILLNESS:      The patient is a 84 y.o.  male who is admitted to the hospital for CHF exacerbation.    This 84-year-old male is admitted for management of acute decompensated heart failure.  He has a very complex cardiac history, including recent admission at [facility] for TAVR procedure on 3/4/2025.  Echocardiogram on 3/5/2025 demonstrated EF of 65% as well as severe tricuspid regurgitation.  At that time, patient's blood pressure medications were adjusted.  Recently, he saw his cardiologist who put him on Bumex for lower extremity swelling.  The swelling continued to worsen despite this, and he subsequently presented to [facility] facility emergency department with complaints of increased peripheral edema, ambulatory dysfunction, fatigue, shortness of breath.  Patient reportedly restarted his oral Lasix approximately 3 days ago, however has not seen any improvement. Patient denies any blood in his stool, hematuria, or active bleeding that he is aware of.  Due to his chronic anticoagulation, patient does state that he bleeds and bruises easily.  He reports generalized fatigue and weakness, however denies any fevers, chills, chest pain, cough, palpitations, abdominal pain, nausea, vomiting.  No other reported concerns at this time.     Seen in office last week by Dr. Burns and 
Renal Consult Note    Patient :  Grupo Pérez; 84 y.o. MRN# [id number]  Location:  1104/1104-01  Attending:  Joey Espitia MD  Admit Date:  3/25/2025   Hospital Day: 2    Reason for Consult:     Asked by Joey Decker MD to see for DAMARIS/Elevated Creatinine.    History Obtained From:     Patient/wife    History of Present Illness:     Grupo Pérez; 84 y.o. male with past medical history as mentioned below.  Known history of hypertension although blood pressures are fluctuated.  He also has known history of peripheral vascular disease has had revascularization in his lower extremities.  In addition he has known history of aortic stenosis for which he underwent transcatheter aortic valve replacement 3/5/2025 at [facility] by Dr. Burns.  Subsequently he started noticing onset of lower extremity discomfort especially on the left lower extremity with weeping.  He saw his vascular surgeon and it was felt that he may be developing vascular occlusion.  He underwent CTA of the abdominal with runoff to lower extremities on 3/13/2025.  Was found to have infrapopliteal occlusions.  He also found to have significant vascular disease.  In addition he was found to have bladder outlet obstruction with distended bladder and bilateral dilatation of ureters and calyces.  Patient's wife tells me that he has both have improved symptoms of dribbling and incomplete bladder emptying pretty much after his TAVR.  He does have known history of bladder outlet obstruction and has been following up with Dr. Medrano and even had a cystoscopy done by him.  Patient does not wish to follow-up with his urologist anymore due to bad experience after cystoscopy last year.  Subsequently he started noticing onset of weakness, difficulty in ambulation, lower extremity edema and shortness of breath.  That brought him to the ER.  He also had a drop in hemoglobin from 11-7.  I see that his creatinine prior to his TAVR was in the 
(transcatheter aortic valve replacement)    Acute decompensated heart failure (HCC)    Secondary hypercoagulable state    BPH (benign prostatic hyperplasia)    NSTEMI (non-ST elevated myocardial infarction) (HCC)    Acute on chronic diastolic heart failure (HCC)       Plan: Maintain indwelling Hidalgo monitor renal function  Ultrasonography ordered for follow-up  Ruel Negro MD  5:38 PM 3/27/2025

## 2025-03-27 NOTE — PLAN OF CARE
Problem: Discharge Planning  Goal: Discharge to home or other facility with appropriate resources  3/27/2025 1957 by Arielle Vora, RN  Outcome: Progressing     Problem: Pain  Goal: Verbalizes/displays adequate comfort level or baseline comfort level  3/27/2025 1957 by Arielle Vora, RN  Outcome: Progressing     Problem: Safety - Adult  Goal: Free from fall injury  3/27/2025 1957 by Arielle Vora, RN  Outcome: Progressing     Problem: ABCDS Injury Assessment  Goal: Absence of physical injury  3/27/2025 1957 by Arielle Vora, RN  Outcome: Progressing

## 2025-03-27 NOTE — CARE COORDINATION
Post Acute Facility/Agency List     Provided spouse with the following list, the list includes the overall star ratings obtained from CMS per the Medicare Web site (www.Medicare.gov):     [] Long Term Acute Care Facilities  [] Acute Inpatient Rehabilitation Facilities  [x] Skilled Nursing Facilities  [] Hospice Facilities  [] Home Care    Provided verbal instructions on how to utilize the QR Code to obtain additional detailed star ratings from www.Medicare.gov     offered to print and provide the detailed list:    []Accepted   [x]Declined    SNF list provided to spouse. She states that the patient will most likely decline SNF placement.   Patient is not ready for discharge at this time.     Will need to speak with patient and wife when patient is more alert and oriented.

## 2025-03-28 ENCOUNTER — APPOINTMENT (OUTPATIENT)
Dept: ULTRASOUND IMAGING | Age: 85
DRG: 280 | End: 2025-03-28
Payer: COMMERCIAL

## 2025-03-28 ENCOUNTER — ANESTHESIA EVENT (OUTPATIENT)
Dept: OPERATING ROOM | Age: 85
End: 2025-03-28
Payer: COMMERCIAL

## 2025-03-28 ENCOUNTER — ANESTHESIA (OUTPATIENT)
Dept: OPERATING ROOM | Age: 85
End: 2025-03-28
Payer: COMMERCIAL

## 2025-03-28 LAB
ALBUMIN PERCENT: 58 % (ref 56–66)
ALBUMIN SERPL-MCNC: 3.1 G/DL (ref 3.2–5.2)
ALPHA 2 PERCENT: 8 % (ref 7–12)
ALPHA1 GLOB SERPL ELPH-MCNC: 0.3 G/DL (ref 0.1–0.4)
ALPHA1 GLOB SERPL ELPH-MCNC: 6 % (ref 3–5)
ALPHA2 GLOB SERPL ELPH-MCNC: 0.4 G/DL (ref 0.5–0.9)
ANION GAP SERPL CALCULATED.3IONS-SCNC: 12 MMOL/L (ref 9–16)
B-GLOBULIN SERPL ELPH-MCNC: 0.7 G/DL (ref 0.7–1.4)
B-GLOBULIN SERPL ELPH-MCNC: 12 % (ref 8–13)
BASOPHILS # BLD: 0.06 K/UL (ref 0–0.2)
BASOPHILS NFR BLD: 1 %
BUN SERPL-MCNC: 73 MG/DL (ref 8–23)
CALCIUM SERPL-MCNC: 8.5 MG/DL (ref 8.8–10.2)
CHLORIDE SERPL-SCNC: 106 MMOL/L (ref 98–107)
CO2 SERPL-SCNC: 21 MMOL/L (ref 20–31)
CREAT SERPL-MCNC: 2 MG/DL (ref 0.7–1.2)
EOSINOPHIL # BLD: 0.11 K/UL (ref 0–0.4)
EOSINOPHILS RELATIVE PERCENT: 2 % (ref 1–4)
ERYTHROCYTE [DISTWIDTH] IN BLOOD BY AUTOMATED COUNT: 19.9 % (ref 11.8–14.4)
GAMMA GLOB SERPL ELPH-MCNC: 0.8 G/DL (ref 0.5–1.5)
GAMMA GLOBULIN %: 16 % (ref 11–19)
GFR, ESTIMATED: 32 ML/MIN/1.73M2
GLUCOSE SERPL-MCNC: 94 MG/DL (ref 82–115)
HCT VFR BLD AUTO: 23.7 % (ref 40.7–50.3)
HGB BLD-MCNC: 7.9 G/DL (ref 13–17)
IMM GRANULOCYTES # BLD AUTO: 0 K/UL (ref 0–0.3)
IMM GRANULOCYTES NFR BLD: 0 %
ITYP INTERPRETATION: NORMAL
LYMPHOCYTES NFR BLD: 0.34 K/UL (ref 1–4.8)
LYMPHOCYTES RELATIVE PERCENT: 6 % (ref 24–44)
MAGNESIUM SERPL-MCNC: 2.6 MG/DL (ref 1.6–2.4)
MCH RBC QN AUTO: 33.5 PG (ref 25.2–33.5)
MCHC RBC AUTO-ENTMCNC: 33.3 G/DL (ref 28.4–34.8)
MCV RBC AUTO: 100.4 FL (ref 82.6–102.9)
MONOCYTES NFR BLD: 0.73 K/UL (ref 0.2–0.8)
MONOCYTES NFR BLD: 13 % (ref 1–7)
NEUTROPHILS NFR BLD: 78 % (ref 36–66)
NEUTS SEG NFR BLD: 4.36 K/UL (ref 1.8–7.7)
NRBC BLD-RTO: 0 PER 100 WBC
PATH REV: NORMAL
PATHOLOGIST: ABNORMAL
PLATELET # BLD AUTO: 110 K/UL (ref 138–453)
PMV BLD AUTO: 10.4 FL (ref 8.1–13.5)
POTASSIUM SERPL-SCNC: 4.7 MMOL/L (ref 3.7–5.3)
PROT PATTERN SERPL ELPH-IMP: ABNORMAL
PROT SERPL-MCNC: 5.3 G/DL (ref 6.6–8.7)
RBC # BLD AUTO: 2.36 M/UL (ref 4.21–5.77)
SODIUM SERPL-SCNC: 139 MMOL/L (ref 136–145)
TOTAL PROT. SUM,%: 100 % (ref 98–102)
TOTAL PROT. SUM: 5.3 G/DL (ref 6.3–8.2)
WBC OTHER # BLD: 5.6 K/UL (ref 3.5–11.3)

## 2025-03-28 PROCEDURE — 0DJD8ZZ INSPECTION OF LOWER INTESTINAL TRACT, VIA NATURAL OR ARTIFICIAL OPENING ENDOSCOPIC: ICD-10-PCS | Performed by: INTERNAL MEDICINE

## 2025-03-28 PROCEDURE — 2580000003 HC RX 258: Performed by: INTERNAL MEDICINE

## 2025-03-28 PROCEDURE — 6370000000 HC RX 637 (ALT 250 FOR IP): Performed by: INTERNAL MEDICINE

## 2025-03-28 PROCEDURE — 80048 BASIC METABOLIC PNL TOTAL CA: CPT

## 2025-03-28 PROCEDURE — 3700000001 HC ADD 15 MINUTES (ANESTHESIA): Performed by: INTERNAL MEDICINE

## 2025-03-28 PROCEDURE — 3609017100 HC EGD: Performed by: INTERNAL MEDICINE

## 2025-03-28 PROCEDURE — 2500000003 HC RX 250 WO HCPCS: Performed by: NURSE ANESTHETIST, CERTIFIED REGISTERED

## 2025-03-28 PROCEDURE — 6370000000 HC RX 637 (ALT 250 FOR IP)

## 2025-03-28 PROCEDURE — 99233 SBSQ HOSP IP/OBS HIGH 50: CPT | Performed by: SURGERY

## 2025-03-28 PROCEDURE — 99232 SBSQ HOSP IP/OBS MODERATE 35: CPT | Performed by: INTERNAL MEDICINE

## 2025-03-28 PROCEDURE — 7100000001 HC PACU RECOVERY - ADDTL 15 MIN: Performed by: INTERNAL MEDICINE

## 2025-03-28 PROCEDURE — 6360000002 HC RX W HCPCS: Performed by: NURSE ANESTHETIST, CERTIFIED REGISTERED

## 2025-03-28 PROCEDURE — 76770 US EXAM ABDO BACK WALL COMP: CPT

## 2025-03-28 PROCEDURE — 2500000003 HC RX 250 WO HCPCS

## 2025-03-28 PROCEDURE — 83735 ASSAY OF MAGNESIUM: CPT

## 2025-03-28 PROCEDURE — 2500000003 HC RX 250 WO HCPCS: Performed by: INTERNAL MEDICINE

## 2025-03-28 PROCEDURE — 36415 COLL VENOUS BLD VENIPUNCTURE: CPT

## 2025-03-28 PROCEDURE — 6360000002 HC RX W HCPCS: Performed by: INTERNAL MEDICINE

## 2025-03-28 PROCEDURE — 2060000000 HC ICU INTERMEDIATE R&B

## 2025-03-28 PROCEDURE — 2580000003 HC RX 258: Performed by: NURSE ANESTHETIST, CERTIFIED REGISTERED

## 2025-03-28 PROCEDURE — 3700000000 HC ANESTHESIA ATTENDED CARE: Performed by: INTERNAL MEDICINE

## 2025-03-28 PROCEDURE — 6360000002 HC RX W HCPCS: Performed by: NURSE PRACTITIONER

## 2025-03-28 PROCEDURE — 2580000003 HC RX 258: Performed by: NURSE PRACTITIONER

## 2025-03-28 PROCEDURE — 2709999900 HC NON-CHARGEABLE SUPPLY: Performed by: INTERNAL MEDICINE

## 2025-03-28 PROCEDURE — 3609027000 HC COLONOSCOPY: Performed by: INTERNAL MEDICINE

## 2025-03-28 PROCEDURE — 76705 ECHO EXAM OF ABDOMEN: CPT

## 2025-03-28 PROCEDURE — 0DJ08ZZ INSPECTION OF UPPER INTESTINAL TRACT, VIA NATURAL OR ARTIFICIAL OPENING ENDOSCOPIC: ICD-10-PCS | Performed by: INTERNAL MEDICINE

## 2025-03-28 PROCEDURE — 85025 COMPLETE CBC W/AUTO DIFF WBC: CPT

## 2025-03-28 PROCEDURE — 7100000000 HC PACU RECOVERY - FIRST 15 MIN: Performed by: INTERNAL MEDICINE

## 2025-03-28 RX ORDER — DEXTROSE MONOHYDRATE AND SODIUM CHLORIDE 5; .9 G/100ML; G/100ML
INJECTION, SOLUTION INTRAVENOUS CONTINUOUS
Status: ACTIVE | OUTPATIENT
Start: 2025-03-28 | End: 2025-03-29

## 2025-03-28 RX ORDER — LIDOCAINE HYDROCHLORIDE 20 MG/ML
INJECTION, SOLUTION EPIDURAL; INFILTRATION; INTRACAUDAL; PERINEURAL
Status: DISCONTINUED | OUTPATIENT
Start: 2025-03-28 | End: 2025-03-28 | Stop reason: SDUPTHER

## 2025-03-28 RX ORDER — PROPOFOL 10 MG/ML
INJECTION, EMULSION INTRAVENOUS
Status: DISCONTINUED | OUTPATIENT
Start: 2025-03-28 | End: 2025-03-28 | Stop reason: SDUPTHER

## 2025-03-28 RX ORDER — SODIUM CHLORIDE 9 MG/ML
INJECTION, SOLUTION INTRAVENOUS
Status: DISCONTINUED | OUTPATIENT
Start: 2025-03-28 | End: 2025-03-28 | Stop reason: SDUPTHER

## 2025-03-28 RX ORDER — EPHEDRINE SULFATE/0.9% NACL/PF 25 MG/5 ML
SYRINGE (ML) INTRAVENOUS
Status: DISCONTINUED | OUTPATIENT
Start: 2025-03-28 | End: 2025-03-28 | Stop reason: SDUPTHER

## 2025-03-28 RX ADMIN — ATORVASTATIN CALCIUM 10 MG: 10 TABLET, FILM COATED ORAL at 20:25

## 2025-03-28 RX ADMIN — SODIUM CHLORIDE, PRESERVATIVE FREE 10 ML: 5 INJECTION INTRAVENOUS at 20:25

## 2025-03-28 RX ADMIN — SODIUM CHLORIDE: 9 INJECTION, SOLUTION INTRAVENOUS at 14:20

## 2025-03-28 RX ADMIN — DEXTROSE AND SODIUM CHLORIDE: 5; .9 INJECTION, SOLUTION INTRAVENOUS at 15:48

## 2025-03-28 RX ADMIN — SODIUM CHLORIDE 40 MG: 9 INJECTION INTRAMUSCULAR; INTRAVENOUS; SUBCUTANEOUS at 15:51

## 2025-03-28 RX ADMIN — EPHEDRINE SULFATE 10 MG: 5 INJECTION INTRAVENOUS at 14:40

## 2025-03-28 RX ADMIN — SODIUM CHLORIDE 40 MG: 9 INJECTION INTRAMUSCULAR; INTRAVENOUS; SUBCUTANEOUS at 03:51

## 2025-03-28 RX ADMIN — TIMOLOL MALEATE 1 DROP: 5 SOLUTION OPHTHALMIC at 07:29

## 2025-03-28 RX ADMIN — TAMSULOSIN HYDROCHLORIDE 0.4 MG: 0.4 CAPSULE ORAL at 07:28

## 2025-03-28 RX ADMIN — PROPOFOL 40 MG: 10 INJECTION, EMULSION INTRAVENOUS at 14:35

## 2025-03-28 RX ADMIN — PROPOFOL 50 MG: 10 INJECTION, EMULSION INTRAVENOUS at 14:28

## 2025-03-28 RX ADMIN — SODIUM CHLORIDE, PRESERVATIVE FREE 10 ML: 5 INJECTION INTRAVENOUS at 07:29

## 2025-03-28 RX ADMIN — LIDOCAINE HYDROCHLORIDE 40 MG: 20 INJECTION, SOLUTION EPIDURAL; INFILTRATION; INTRACAUDAL; PERINEURAL at 14:28

## 2025-03-28 ASSESSMENT — PAIN DESCRIPTION - LOCATION
LOCATION: FOOT
LOCATION: FOOT

## 2025-03-28 ASSESSMENT — PAIN SCALES - GENERAL
PAINLEVEL_OUTOF10: 5
PAINLEVEL_OUTOF10: 5

## 2025-03-28 ASSESSMENT — PAIN DESCRIPTION - ORIENTATION
ORIENTATION: RIGHT;LEFT
ORIENTATION: RIGHT;LEFT

## 2025-03-28 ASSESSMENT — PAIN - FUNCTIONAL ASSESSMENT: PAIN_FUNCTIONAL_ASSESSMENT: NONE - DENIES PAIN

## 2025-03-28 NOTE — PLAN OF CARE
Problem: Discharge Planning  Goal: Discharge to home or other facility with appropriate resources  3/28/2025 0930 by Arianna Quintanilla RN  Outcome: Progressing  Flowsheets (Taken 3/28/2025 0800)  Discharge to home or other facility with appropriate resources: Identify barriers to discharge with patient and caregiver  3/27/2025 1957 by Arielle Vora RN  Outcome: Progressing     Problem: Pain  Goal: Verbalizes/displays adequate comfort level or baseline comfort level  3/28/2025 0930 by Arianna Quintanilla RN  Outcome: Progressing  3/27/2025 1957 by Arielle Vora RN  Outcome: Progressing     Problem: ABCDS Injury Assessment  Goal: Absence of physical injury  3/28/2025 0930 by Arianna Quintanilla RN  Outcome: Progressing  3/27/2025 1957 by Arielle Vora RN  Outcome: Progressing     Problem: Safety - Adult  Goal: Free from fall injury  3/28/2025 0930 by Arianna Quintanilla RN  Outcome: Progressing  3/27/2025 1957 by Arielle Vora RN  Outcome: Progressing

## 2025-03-28 NOTE — CARE COORDINATION
Social work:  Message left for Oksana/spouse to further discuss dc plans- snf vs hhc, await c/b.  During previous discussion Oksana felt patient would likely refuse snf.   He was not ready to commit to a dc dispo this morning during conversation, discussed possibly [facility] or [place] d/t locations.   Both can accept pending bed availability at the time of dc; but again, need to confirm IF patient is agreeable to plan and preferred location.   [facility] also accepted.

## 2025-03-28 NOTE — ANESTHESIA PRE PROCEDURE
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    Arthritis     Blood circulation, collateral     Bradycardia     CAD (coronary artery disease)     Former smoker     GERD (gastroesophageal reflux disease)     History of cardiac cath     Hyperlipidemia     Hypertension     S/P transesophageal echocardiogram (ESTEVAN)        Past Surgical History:        Procedure Laterality Date    CARDIAC PROCEDURE N/A 2025    taleb / estevan/Left and right heart cath / coronary angiography performed by Vidhi Burns MD at [facility] CARDIAC CATH LAB    CARDIAC PROCEDURE N/A 2025    Estevan during cath case performed by Vidhi Burns MD at [facility] CARDIAC CATH LAB    CARDIAC PROCEDURE N/A 3/4/2025    taleb / Transcatheter aortic valve replacement performed by Vidhi Burns MD at [facility] CVOR    CARDIAC SURGERY N/A 3/4/2025    TRANSCATHETER AORTIC VALVE REPLACEMENT FEMORAL APPROACH performed by Vignesh Hogan MD at [facility]    COLONOSCOPY      ENDOSCOPY, COLON, DIAGNOSTIC      HERNIA REPAIR      INVASIVE VASCULAR N/A 2025    Ultrasound guided vascular access performed by Vidhi Burns MD at [facility] CARDIAC CATH LAB    JOINT REPLACEMENT      JOINT REPLACEMENT  2009    NEUROVASCULAR PROCEDURE N/A 3/4/2025    Ultrasound guided vascular access ([id number]) performed by Vidhi Burns MD at [facility] CVOR    THROMBECTOMY Right 2013    dr Erick MARTINEZ; INR was subtherapeutic following shoulder surgery    TONSILLECTOMY      TOTAL HIP ARTHROPLASTY         Social History:    Social History     Tobacco Use    Smoking status: Former     Current packs/day: 0.00     Types: Cigarettes     Quit date: 1970     Years since quittin.2    Smokeless tobacco: Current   Substance Use Topics    Alcohol use: Not Currently     Comment: 3 beers weekly; prev daily drinker                                Ready to quit: Not Answered  Counseling given: Not

## 2025-03-28 NOTE — ANESTHESIA POSTPROCEDURE EVALUATION
Department of Anesthesiology  Postprocedure Note    Patient: Grupo Pérez  MRN: [id number]  YOB: 1940  Date of evaluation: 3/28/2025    Procedure Summary       Date: 03/28/25 Room / Location: [facility]    Anesthesia Start: 1420 Anesthesia Stop: 1502    Procedures:       ESOPHAGOGASTRODUODENOSCOPY (Mouth)      COLONOSCOPY DIAGNOSTIC (Anus) Diagnosis:       Gastrointestinal hemorrhage, unspecified gastrointestinal hemorrhage type      (Gastrointestinal hemorrhage, unspecified gastrointestinal hemorrhage type [K92.2])    Surgeons: Sravan Bennett MD Responsible Provider: Yazan Francisco DO    Anesthesia Type: general, TIVA ASA Status: 3            Anesthesia Type: No value filed.    Ana Phase I: Ana Score: 10    Ana Phase II:      Anesthesia Post Evaluation    Patient location during evaluation: PACU  Patient participation: complete - patient participated  Level of consciousness: awake and alert  Airway patency: patent  Nausea & Vomiting: no nausea and no vomiting  Cardiovascular status: hemodynamically stable  Respiratory status: acceptable  Hydration status: stable  Pain management: adequate    No notable events documented.

## 2025-03-28 NOTE — OP NOTE
[facility] Endoscopy  EGD and COLONOSCOPY    Patient: Grupo Pérez   Date:  3/28/2025   : 1940         Med Rec#: [id number]     Procedure:  EGD  and Colonoscopy  Indication: Anemia, reported melena    Findings   Schatzki's ring with irregular Z-line.  Small hiatal hernia  Moderate pan gastritis with hematin, small amounts.  Moderate duodenitis.    Hypertrophied anal papillae  Internal hemorrhoid  Atonic anal  Left-sided diverticula  No blood, fresh or denatured, in the colon or terminal ileum    Recommendations  Would not recommend further colonoscopies given his age and comorbidity  Aggressive PPI therapy recommended    Appropriate Photos Taken: Affirmative    Specimen(s) Removed: As stated above  Previous Colonoscopy:   Not known  Withdrawal Time:   8 minutes  Estimated Blood Loss: None  Anesthesia:  MAC    Complications:    Sub-optimal bowel prep  Description of Procedure:  Informed consent was obtained after explanation of the procedure including indications, description of the procedure,  benefits and possible risks and complications of the procedure, and alternatives.  All questions were answered.  The patient's history was reviewed and a directed physical examination was performed prior to the procedure.  The patient was monitored throughout the procedure with pulse oximetry and periodic assessment of vital signs.  After adequate sedation was achieved, the scope was inserted into the mouth and advanced to the second portion of the duodenum.  As the scope was withdrawn, the anatomy and the appearance of the mucosa was noted.  A digital rectal examination was then performed.  The video endoscope was placed in the patient's rectum and advanced without difficulty  to the terminal ileum, which was identified by the ileocecal valve and appendiceal orifice.  The prep was adequate.   Examination of the mucosa and the anatomy was performed during both introduction and withdrawal of the endoscope.

## 2025-03-28 NOTE — CARE COORDINATION
Discharge planning    Plan for egd and colonoscopy today. Plan is [place] with [place] and [place] vs [facility]. Lives with wife in 1 story home. Has crutches, rw, and cane. Pt. Still debating if he wants to go to SNF or not.

## 2025-03-28 NOTE — CARE COORDINATION
Post Acute Facility/Agency List     Provided patient with the following list, the list includes the overall star ratings obtained from CMS per the Medicare Web site (www.Medicare.gov):     [] Long Term Acute Care Facilities  [] Acute Inpatient Rehabilitation Facilities  [] Skilled Nursing Facilities  [] Hospice Facilities  [x] Home Care    Provided verbal instructions on how to utilize the QR Code to obtain additional detailed star ratings from www.Medicare.gov     offered to print and provide the detailed list:    []Accepted   [x]Declined    Referral to [facility] and they accepted.

## 2025-03-28 NOTE — CARE COORDINATION
Social work: Writer and Adwoa/CHANDNI MYERS met with paulina at bedside to further discuss dc plan needs- he is hesitant to discuss, states he'll decide what he wants to do when that time comes.   Writer explained need to start dc planning early and benefits of snf and/or home care. Adwoa/LOUIS had talked to wife yesterday and provided with SNF and home care lists, she feels patient will decline snf.   Pt's mom was at [facility] in the past, may consider this option or [facility] d/t location, IF agreeable to snf.  HC choice given of [facility].   Referrals sent.                        Post Acute Facility/Agency List     Provided spouse with the following list, the list includes the overall star ratings obtained from CMS per the Medicare Web site (www.Medicare.gov):     [] Long Term Acute Care Facilities  [] Acute Inpatient Rehabilitation Facilities  [x] Skilled Nursing Facilities  [] Hospice Facilities  [x] Home Care    Provided verbal instructions on how to utilize the QR Code to obtain additional detailed star ratings from www.Medicare.gov     offered to print and provide the detailed list:    []Accepted   [x]Declined

## 2025-03-29 ENCOUNTER — APPOINTMENT (OUTPATIENT)
Dept: GENERAL RADIOLOGY | Age: 85
DRG: 280 | End: 2025-03-29
Payer: COMMERCIAL

## 2025-03-29 LAB
ANION GAP SERPL CALCULATED.3IONS-SCNC: 12 MMOL/L (ref 9–16)
BNP SERPL-MCNC: 4312 PG/ML (ref 0–450)
BUN SERPL-MCNC: 55 MG/DL (ref 8–23)
CALCIUM SERPL-MCNC: 8.4 MG/DL (ref 8.8–10.2)
CHLORIDE SERPL-SCNC: 108 MMOL/L (ref 98–107)
CO2 SERPL-SCNC: 21 MMOL/L (ref 20–31)
CREAT SERPL-MCNC: 1.3 MG/DL (ref 0.7–1.2)
ERYTHROCYTE [DISTWIDTH] IN BLOOD BY AUTOMATED COUNT: 19.6 % (ref 11.8–14.4)
GFR, ESTIMATED: 53 ML/MIN/1.73M2
GLUCOSE SERPL-MCNC: 115 MG/DL (ref 82–115)
HCT VFR BLD AUTO: 23 % (ref 40.7–50.3)
HGB BLD-MCNC: 7.5 G/DL (ref 13–17)
MAGNESIUM SERPL-MCNC: 2.4 MG/DL (ref 1.6–2.4)
MCH RBC QN AUTO: 33.3 PG (ref 25.2–33.5)
MCHC RBC AUTO-ENTMCNC: 32.6 G/DL (ref 28.4–34.8)
MCV RBC AUTO: 102.2 FL (ref 82.6–102.9)
NRBC BLD-RTO: 0 PER 100 WBC
PLATELET # BLD AUTO: 101 K/UL (ref 138–453)
PMV BLD AUTO: 10.9 FL (ref 8.1–13.5)
POTASSIUM SERPL-SCNC: 4.4 MMOL/L (ref 3.7–5.3)
RBC # BLD AUTO: 2.25 M/UL (ref 4.21–5.77)
SODIUM SERPL-SCNC: 140 MMOL/L (ref 136–145)
WBC OTHER # BLD: 4.5 K/UL (ref 3.5–11.3)

## 2025-03-29 PROCEDURE — 83735 ASSAY OF MAGNESIUM: CPT

## 2025-03-29 PROCEDURE — 6370000000 HC RX 637 (ALT 250 FOR IP): Performed by: INTERNAL MEDICINE

## 2025-03-29 PROCEDURE — 71045 X-RAY EXAM CHEST 1 VIEW: CPT

## 2025-03-29 PROCEDURE — 2060000000 HC ICU INTERMEDIATE R&B

## 2025-03-29 PROCEDURE — 36415 COLL VENOUS BLD VENIPUNCTURE: CPT

## 2025-03-29 PROCEDURE — 85027 COMPLETE CBC AUTOMATED: CPT

## 2025-03-29 PROCEDURE — 97530 THERAPEUTIC ACTIVITIES: CPT

## 2025-03-29 PROCEDURE — 6360000002 HC RX W HCPCS: Performed by: INTERNAL MEDICINE

## 2025-03-29 PROCEDURE — 99232 SBSQ HOSP IP/OBS MODERATE 35: CPT | Performed by: INTERNAL MEDICINE

## 2025-03-29 PROCEDURE — 94761 N-INVAS EAR/PLS OXIMETRY MLT: CPT

## 2025-03-29 PROCEDURE — 2500000003 HC RX 250 WO HCPCS: Performed by: INTERNAL MEDICINE

## 2025-03-29 PROCEDURE — 2580000003 HC RX 258: Performed by: INTERNAL MEDICINE

## 2025-03-29 PROCEDURE — 80048 BASIC METABOLIC PNL TOTAL CA: CPT

## 2025-03-29 PROCEDURE — 83880 ASSAY OF NATRIURETIC PEPTIDE: CPT

## 2025-03-29 RX ADMIN — Medication 3 MG: at 22:49

## 2025-03-29 RX ADMIN — SODIUM CHLORIDE 40 MG: 9 INJECTION INTRAMUSCULAR; INTRAVENOUS; SUBCUTANEOUS at 15:52

## 2025-03-29 RX ADMIN — SODIUM CHLORIDE, PRESERVATIVE FREE 10 ML: 5 INJECTION INTRAVENOUS at 19:43

## 2025-03-29 RX ADMIN — TIMOLOL MALEATE 1 DROP: 5 SOLUTION OPHTHALMIC at 07:07

## 2025-03-29 RX ADMIN — TAMSULOSIN HYDROCHLORIDE 0.4 MG: 0.4 CAPSULE ORAL at 07:08

## 2025-03-29 RX ADMIN — ATORVASTATIN CALCIUM 10 MG: 10 TABLET, FILM COATED ORAL at 19:43

## 2025-03-29 RX ADMIN — SODIUM CHLORIDE 40 MG: 9 INJECTION INTRAMUSCULAR; INTRAVENOUS; SUBCUTANEOUS at 04:21

## 2025-03-29 RX ADMIN — SODIUM CHLORIDE, PRESERVATIVE FREE 10 ML: 5 INJECTION INTRAVENOUS at 07:07

## 2025-03-29 NOTE — PLAN OF CARE
Problem: Discharge Planning  Goal: Discharge to home or other facility with appropriate resources  Outcome: Progressing  Flowsheets (Taken 3/28/2025 2000)  Discharge to home or other facility with appropriate resources:   Identify barriers to discharge with patient and caregiver   Arrange for needed discharge resources and transportation as appropriate   Identify discharge learning needs (meds, wound care, etc)     Problem: Pain  Goal: Verbalizes/displays adequate comfort level or baseline comfort level  Outcome: Progressing  Flowsheets (Taken 3/28/2025 2000)  Verbalizes/displays adequate comfort level or baseline comfort level:   Encourage patient to monitor pain and request assistance   Assess pain using appropriate pain scale   Administer analgesics based on type and severity of pain and evaluate response   Implement non-pharmacological measures as appropriate and evaluate response     Problem: ABCDS Injury Assessment  Goal: Absence of physical injury  Outcome: Progressing     Problem: Safety - Adult  Goal: Free from fall injury  Outcome: Progressing

## 2025-03-29 NOTE — PLAN OF CARE
Problem: Discharge Planning  Goal: Discharge to home or other facility with appropriate resources  3/29/2025 1012 by Arianna Quintanilla RN  Outcome: Progressing  Flowsheets (Taken 3/29/2025 0730)  Discharge to home or other facility with appropriate resources: Identify barriers to discharge with patient and caregiver  3/29/2025 0617 by Raul Arana RN  Outcome: Progressing  Flowsheets (Taken 3/28/2025 2000)  Discharge to home or other facility with appropriate resources:   Identify barriers to discharge with patient and caregiver   Arrange for needed discharge resources and transportation as appropriate   Identify discharge learning needs (meds, wound care, etc)     Problem: Pain  Goal: Verbalizes/displays adequate comfort level or baseline comfort level  3/29/2025 1012 by Arianna Quintanilla RN  Outcome: Progressing  3/29/2025 0617 by Raul Arana RN  Outcome: Progressing  Flowsheets (Taken 3/28/2025 2000)  Verbalizes/displays adequate comfort level or baseline comfort level:   Encourage patient to monitor pain and request assistance   Assess pain using appropriate pain scale   Administer analgesics based on type and severity of pain and evaluate response   Implement non-pharmacological measures as appropriate and evaluate response     Problem: ABCDS Injury Assessment  Goal: Absence of physical injury  3/29/2025 1012 by Arianna Quintanilla RN  Outcome: Progressing  3/29/2025 0617 by Raul Arana RN  Outcome: Progressing     Problem: Safety - Adult  Goal: Free from fall injury  3/29/2025 1012 by Arianna Quintanilla RN  Outcome: Progressing  3/29/2025 0617 by Raul Arana RN  Outcome: Progressing

## 2025-03-30 LAB
ANION GAP SERPL CALCULATED.3IONS-SCNC: 10 MMOL/L (ref 9–16)
ANTI-XA UNFRAC HEPARIN: 0.46 IU/L (ref 0.3–0.7)
ANTI-XA UNFRAC HEPARIN: <0.1 IU/L (ref 0.3–0.7)
BUN SERPL-MCNC: 41 MG/DL (ref 8–23)
CALCIUM SERPL-MCNC: 8.2 MG/DL (ref 8.8–10.2)
CHLORIDE SERPL-SCNC: 106 MMOL/L (ref 98–107)
CO2 SERPL-SCNC: 22 MMOL/L (ref 20–31)
CREAT SERPL-MCNC: 1.2 MG/DL (ref 0.7–1.2)
ERYTHROCYTE [DISTWIDTH] IN BLOOD BY AUTOMATED COUNT: 18.7 % (ref 11.8–14.4)
ERYTHROCYTE [DISTWIDTH] IN BLOOD BY AUTOMATED COUNT: 18.9 % (ref 11.8–14.4)
GFR, ESTIMATED: 63 ML/MIN/1.73M2
GLUCOSE SERPL-MCNC: 120 MG/DL (ref 82–115)
HCT VFR BLD AUTO: 23 % (ref 40.7–50.3)
HCT VFR BLD AUTO: 25.4 % (ref 40.7–50.3)
HGB BLD-MCNC: 7.5 G/DL (ref 13–17)
HGB BLD-MCNC: 8.3 G/DL (ref 13–17)
INR PPP: 1.3
MAGNESIUM SERPL-MCNC: 2.2 MG/DL (ref 1.6–2.4)
MCH RBC QN AUTO: 33 PG (ref 25.2–33.5)
MCH RBC QN AUTO: 33.6 PG (ref 25.2–33.5)
MCHC RBC AUTO-ENTMCNC: 32.6 G/DL (ref 28.4–34.8)
MCHC RBC AUTO-ENTMCNC: 32.7 G/DL (ref 28.4–34.8)
MCV RBC AUTO: 101.3 FL (ref 82.6–102.9)
MCV RBC AUTO: 102.8 FL (ref 82.6–102.9)
NRBC BLD-RTO: 0 PER 100 WBC
NRBC BLD-RTO: 0 PER 100 WBC
PARTIAL THROMBOPLASTIN TIME: 44.3 SEC (ref 23.9–33.8)
PLATELET # BLD AUTO: 100 K/UL (ref 138–453)
PLATELET # BLD AUTO: 105 K/UL (ref 138–453)
PMV BLD AUTO: 10.8 FL (ref 8.1–13.5)
PMV BLD AUTO: 11 FL (ref 8.1–13.5)
POTASSIUM SERPL-SCNC: 4.2 MMOL/L (ref 3.7–5.3)
PROTHROMBIN TIME: 16.4 SEC (ref 11.5–14.2)
RBC # BLD AUTO: 2.27 M/UL (ref 4.21–5.77)
RBC # BLD AUTO: 2.47 M/UL (ref 4.21–5.77)
SODIUM SERPL-SCNC: 138 MMOL/L (ref 136–145)
WBC OTHER # BLD: 5.3 K/UL (ref 3.5–11.3)
WBC OTHER # BLD: 6 K/UL (ref 3.5–11.3)

## 2025-03-30 PROCEDURE — 2500000003 HC RX 250 WO HCPCS: Performed by: INTERNAL MEDICINE

## 2025-03-30 PROCEDURE — 6370000000 HC RX 637 (ALT 250 FOR IP): Performed by: INTERNAL MEDICINE

## 2025-03-30 PROCEDURE — 36415 COLL VENOUS BLD VENIPUNCTURE: CPT

## 2025-03-30 PROCEDURE — 2060000000 HC ICU INTERMEDIATE R&B

## 2025-03-30 PROCEDURE — 85610 PROTHROMBIN TIME: CPT

## 2025-03-30 PROCEDURE — 85027 COMPLETE CBC AUTOMATED: CPT

## 2025-03-30 PROCEDURE — 83735 ASSAY OF MAGNESIUM: CPT

## 2025-03-30 PROCEDURE — 97110 THERAPEUTIC EXERCISES: CPT

## 2025-03-30 PROCEDURE — 94761 N-INVAS EAR/PLS OXIMETRY MLT: CPT

## 2025-03-30 PROCEDURE — 80048 BASIC METABOLIC PNL TOTAL CA: CPT

## 2025-03-30 PROCEDURE — 6360000002 HC RX W HCPCS: Performed by: INTERNAL MEDICINE

## 2025-03-30 PROCEDURE — 99232 SBSQ HOSP IP/OBS MODERATE 35: CPT | Performed by: INTERNAL MEDICINE

## 2025-03-30 PROCEDURE — 2580000003 HC RX 258: Performed by: INTERNAL MEDICINE

## 2025-03-30 PROCEDURE — 85520 HEPARIN ASSAY: CPT

## 2025-03-30 PROCEDURE — 97530 THERAPEUTIC ACTIVITIES: CPT

## 2025-03-30 PROCEDURE — 85730 THROMBOPLASTIN TIME PARTIAL: CPT

## 2025-03-30 PROCEDURE — 97129 THER IVNTJ 1ST 15 MIN: CPT

## 2025-03-30 RX ORDER — HEPARIN SODIUM 1000 [USP'U]/ML
4000 INJECTION, SOLUTION INTRAVENOUS; SUBCUTANEOUS ONCE
Status: COMPLETED | OUTPATIENT
Start: 2025-03-30 | End: 2025-03-30

## 2025-03-30 RX ORDER — HEPARIN SODIUM 1000 [USP'U]/ML
2000 INJECTION, SOLUTION INTRAVENOUS; SUBCUTANEOUS PRN
Status: DISCONTINUED | OUTPATIENT
Start: 2025-03-30 | End: 2025-03-31

## 2025-03-30 RX ORDER — HEPARIN SODIUM 10000 [USP'U]/100ML
5-30 INJECTION, SOLUTION INTRAVENOUS CONTINUOUS
Status: DISCONTINUED | OUTPATIENT
Start: 2025-03-30 | End: 2025-03-31

## 2025-03-30 RX ORDER — OXYCODONE AND ACETAMINOPHEN 5; 325 MG/1; MG/1
1 TABLET ORAL EVERY 6 HOURS PRN
Refills: 0 | Status: DISCONTINUED | OUTPATIENT
Start: 2025-03-30 | End: 2025-03-31 | Stop reason: HOSPADM

## 2025-03-30 RX ORDER — HEPARIN SODIUM 1000 [USP'U]/ML
4000 INJECTION, SOLUTION INTRAVENOUS; SUBCUTANEOUS PRN
Status: DISCONTINUED | OUTPATIENT
Start: 2025-03-30 | End: 2025-03-31

## 2025-03-30 RX ADMIN — ATORVASTATIN CALCIUM 10 MG: 10 TABLET, FILM COATED ORAL at 20:19

## 2025-03-30 RX ADMIN — TAMSULOSIN HYDROCHLORIDE 0.4 MG: 0.4 CAPSULE ORAL at 08:58

## 2025-03-30 RX ADMIN — HEPARIN SODIUM 4000 UNITS: 1000 INJECTION INTRAVENOUS; SUBCUTANEOUS at 13:40

## 2025-03-30 RX ADMIN — Medication 3 MG: at 23:14

## 2025-03-30 RX ADMIN — HEPARIN SODIUM 11 UNITS/KG/HR: 10000 INJECTION, SOLUTION INTRAVENOUS at 13:46

## 2025-03-30 RX ADMIN — SODIUM CHLORIDE 40 MG: 9 INJECTION INTRAMUSCULAR; INTRAVENOUS; SUBCUTANEOUS at 18:35

## 2025-03-30 RX ADMIN — SODIUM CHLORIDE, PRESERVATIVE FREE 10 ML: 5 INJECTION INTRAVENOUS at 20:19

## 2025-03-30 RX ADMIN — TIMOLOL MALEATE 1 DROP: 5 SOLUTION OPHTHALMIC at 08:59

## 2025-03-30 RX ADMIN — SODIUM CHLORIDE 40 MG: 9 INJECTION INTRAMUSCULAR; INTRAVENOUS; SUBCUTANEOUS at 05:34

## 2025-03-30 RX ADMIN — SODIUM CHLORIDE, PRESERVATIVE FREE 10 ML: 5 INJECTION INTRAVENOUS at 08:58

## 2025-03-30 NOTE — PLAN OF CARE
Problem: Discharge Planning  Goal: Discharge to home or other facility with appropriate resources  Outcome: Progressing  Flowsheets (Taken 3/29/2025 2000)  Discharge to home or other facility with appropriate resources:   Identify barriers to discharge with patient and caregiver   Arrange for needed discharge resources and transportation as appropriate   Identify discharge learning needs (meds, wound care, etc)   Refer to discharge planning if patient needs post-hospital services based on physician order or complex needs related to functional status, cognitive ability or social support system     Problem: Pain  Goal: Verbalizes/displays adequate comfort level or baseline comfort level  Outcome: Progressing  Flowsheets (Taken 3/29/2025 2000)  Verbalizes/displays adequate comfort level or baseline comfort level:   Encourage patient to monitor pain and request assistance   Assess pain using appropriate pain scale   Administer analgesics based on type and severity of pain and evaluate response   Implement non-pharmacological measures as appropriate and evaluate response     Problem: ABCDS Injury Assessment  Goal: Absence of physical injury  Outcome: Progressing     Problem: Safety - Adult  Goal: Free from fall injury  Outcome: Progressing

## 2025-03-30 NOTE — PLAN OF CARE
Problem: Discharge Planning  Goal: Discharge to home or other facility with appropriate resources  3/30/2025 1846 by Adalid Driscoll RN  Outcome: Progressing  Flowsheets (Taken 3/30/2025 0800)  Discharge to home or other facility with appropriate resources: Identify barriers to discharge with patient and caregiver  3/30/2025 0622 by Raul Arana RN  Outcome: Progressing  Flowsheets (Taken 3/29/2025 2000)  Discharge to home or other facility with appropriate resources:   Identify barriers to discharge with patient and caregiver   Arrange for needed discharge resources and transportation as appropriate   Identify discharge learning needs (meds, wound care, etc)   Refer to discharge planning if patient needs post-hospital services based on physician order or complex needs related to functional status, cognitive ability or social support system     Problem: Pain  Goal: Verbalizes/displays adequate comfort level or baseline comfort level  3/30/2025 1846 by Adalid Driscoll RN  Outcome: Progressing  3/30/2025 0622 by Raul Arana RN  Outcome: Progressing  Flowsheets (Taken 3/29/2025 2000)  Verbalizes/displays adequate comfort level or baseline comfort level:   Encourage patient to monitor pain and request assistance   Assess pain using appropriate pain scale   Administer analgesics based on type and severity of pain and evaluate response   Implement non-pharmacological measures as appropriate and evaluate response     Problem: ABCDS Injury Assessment  Goal: Absence of physical injury  3/30/2025 1846 by Adalid Driscoll RN  Outcome: Progressing  3/30/2025 0622 by Raul Arana RN  Outcome: Progressing     Problem: Safety - Adult  Goal: Free from fall injury  3/30/2025 1846 by Adalid Driscoll RN  Outcome: Progressing  3/30/2025 0622 by Raul Arana RN  Outcome: Progressing

## 2025-03-31 VITALS
OXYGEN SATURATION: 96 % | TEMPERATURE: 97.6 F | BODY MASS INDEX: 26.77 KG/M2 | SYSTOLIC BLOOD PRESSURE: 144 MMHG | HEIGHT: 71 IN | RESPIRATION RATE: 14 BRPM | DIASTOLIC BLOOD PRESSURE: 50 MMHG | WEIGHT: 191.2 LBS | HEART RATE: 77 BPM

## 2025-03-31 LAB
ANTI-XA UNFRAC HEPARIN: 0.35 IU/L (ref 0.3–0.7)
ANTI-XA UNFRAC HEPARIN: 0.38 IU/L (ref 0.3–0.7)
BNP SERPL-MCNC: 7396 PG/ML (ref 0–450)

## 2025-03-31 PROCEDURE — 2500000003 HC RX 250 WO HCPCS: Performed by: INTERNAL MEDICINE

## 2025-03-31 PROCEDURE — 6360000002 HC RX W HCPCS: Performed by: INTERNAL MEDICINE

## 2025-03-31 PROCEDURE — 97535 SELF CARE MNGMENT TRAINING: CPT

## 2025-03-31 PROCEDURE — 99232 SBSQ HOSP IP/OBS MODERATE 35: CPT | Performed by: INTERNAL MEDICINE

## 2025-03-31 PROCEDURE — 94761 N-INVAS EAR/PLS OXIMETRY MLT: CPT

## 2025-03-31 PROCEDURE — 2580000003 HC RX 258: Performed by: INTERNAL MEDICINE

## 2025-03-31 PROCEDURE — 6370000000 HC RX 637 (ALT 250 FOR IP): Performed by: INTERNAL MEDICINE

## 2025-03-31 PROCEDURE — 97110 THERAPEUTIC EXERCISES: CPT

## 2025-03-31 PROCEDURE — 99233 SBSQ HOSP IP/OBS HIGH 50: CPT | Performed by: NURSE PRACTITIONER

## 2025-03-31 PROCEDURE — 36415 COLL VENOUS BLD VENIPUNCTURE: CPT

## 2025-03-31 PROCEDURE — 97530 THERAPEUTIC ACTIVITIES: CPT

## 2025-03-31 PROCEDURE — 83880 ASSAY OF NATRIURETIC PEPTIDE: CPT

## 2025-03-31 PROCEDURE — 85520 HEPARIN ASSAY: CPT

## 2025-03-31 RX ORDER — PANTOPRAZOLE SODIUM 40 MG/1
40 TABLET, DELAYED RELEASE ORAL
Qty: 30 TABLET | Refills: 1 | Status: SHIPPED | OUTPATIENT
Start: 2025-03-31

## 2025-03-31 RX ORDER — PANTOPRAZOLE SODIUM 40 MG/1
40 TABLET, DELAYED RELEASE ORAL
Status: DISCONTINUED | OUTPATIENT
Start: 2025-03-31 | End: 2025-03-31 | Stop reason: HOSPADM

## 2025-03-31 RX ADMIN — SODIUM CHLORIDE, PRESERVATIVE FREE 10 ML: 5 INJECTION INTRAVENOUS at 09:42

## 2025-03-31 RX ADMIN — TIMOLOL MALEATE 1 DROP: 5 SOLUTION OPHTHALMIC at 09:55

## 2025-03-31 RX ADMIN — PANTOPRAZOLE SODIUM 40 MG: 40 TABLET, DELAYED RELEASE ORAL at 09:42

## 2025-03-31 RX ADMIN — SODIUM CHLORIDE 40 MG: 9 INJECTION INTRAMUSCULAR; INTRAVENOUS; SUBCUTANEOUS at 05:29

## 2025-03-31 RX ADMIN — APIXABAN 5 MG: 5 TABLET, FILM COATED ORAL at 09:42

## 2025-03-31 RX ADMIN — ASPIRIN 81 MG: 81 TABLET, CHEWABLE ORAL at 09:42

## 2025-03-31 RX ADMIN — Medication 12.5 MG: at 09:42

## 2025-03-31 RX ADMIN — TAMSULOSIN HYDROCHLORIDE 0.4 MG: 0.4 CAPSULE ORAL at 09:42

## 2025-03-31 NOTE — CARE COORDINATION
Walker order faxed to [facility] with instructions to the patient to call when home so they can deliver walker to the home.

## 2025-03-31 NOTE — DISCHARGE SUMMARY
[facility]  Office: [phone]  Jorge Loredo DO, Magnus Rivers DO, Bubba Graham DO, Hector Butler DO, Joey Espitia MD, Loraine Torres MD, Natalie Uriarte MD, Estrellita Rice MD,  James Wilhelm MD, Maddei Mccloud MD, Ryan Hu MD,  Zelda Armenta DO, Beverley Ardon MD, Asa Rocha MD, Sravan Loredo DO, Elizabeth Alegre MD,  Rik Shah DO, Navya Thompson MD, Moni Riley MD, Eve Sue MD,  David Edwards MD, Evan Giron MD, Aidee Deleon MD, Cherise Vallejo MD, Godwin Chung MD, Reggie Padgett MD, Vignesh Tran DO, Jarod Padron MD, Zelda Welch MD, Mohsin Reza, MD, Joe Koroma MD, Shirley Waterhouse, CNP,  Mary Dumont CNP, Vignesh Cooper, CNP,  Nuha Wong, [facility], Ana María Anne, CNP, Angy Carrillo, CNP, Danyelle Storey, CNP, Cristina Florentino, CNP, Juany Schmid, PA-C, Shilpa Munoz, CNP, Angel Valdivia, CNP,  Emmy Alonso, CNP, Tara Toro, CNP, Berny Fabian, PA-C, Olga Bashir, CNP,  Avis Fall, CNS, Krystina Pradhan, CNP, Rylie Leger, CNP,   Fiorella Chance, CNP         [facility]   IN-PATIENT SERVICE   [facility]    Discharge Summary     Patient ID: Grupo Pérez  :  1940   MRN: [id number]     ACCOUNT:  [id number]   Patient's PCP: Roger Mcmullen MD  Admit Date: 3/25/2025   Discharge Date: 3/31/2025     Length of Stay: 6  Code Status:  Full Code  Admitting Physician: Joey Espitia MD  Discharge Physician: Joey Espitia MD     Active Discharge Diagnoses:     Hospital Problem Lists:  Principal Problem:    Acute decompensated heart failure (HCC)  Active Problems:    HTN (hypertension)    Dyslipidemia    Chronic a-fib (HCC)    Aortic insufficiency    Anticoagulated    PVD (peripheral vascular disease)    S/P TAVR (transcatheter aortic valve replacement)    Secondary hypercoagulable state    BPH (benign prostatic hyperplasia)    NSTEMI (non-ST elevated myocardial infarction) ([facility])    Acute on chronic

## 2025-03-31 NOTE — PROGRESS NOTES
Ruel Negro MD   Urology Progress Note            Subjective: Follow-up urinary retention acute kidney injury    Patient Vitals for the past 24 hrs:   BP Temp Temp src Pulse Resp SpO2 Weight   03/28/25 1800 (!) 113/58 -- -- 66 12 94 % --   03/28/25 1700 (!) 103/45 -- -- 54 19 97 % --   03/28/25 1600 (!) 112/58 97.7 °F (36.5 °C) Oral 56 14 97 % --   03/28/25 1545 (!) 115/53 -- -- 59 15 97 % --   03/28/25 1530 (!) 114/55 97.9 °F (36.6 °C) -- 57 13 95 % --   03/28/25 1515 (!) 106/51 -- -- 62 13 96 % --   03/28/25 1500 (!) 86/48 97.7 °F (36.5 °C) Temporal 61 12 94 % --   03/28/25 1400 -- -- -- 55 20 98 % --   03/28/25 1300 (!) 115/32 -- -- 51 10 95 % --   03/28/25 1200 -- -- -- 51 16 98 % --   03/28/25 1100 (!) 110/49 -- -- 51 16 99 % --   03/28/25 1030 (!) 105/51 -- -- (!) 45 15 -- --   03/28/25 1000 (!) 84/49 -- -- (!) 48 21 96 % --   03/28/25 0930 -- -- -- 50 11 -- --   03/28/25 0900 (!) 143/43 -- -- 63 15 98 % --   03/28/25 0800 138/71 98.2 °F (36.8 °C) Axillary 60 18 98 % --   03/28/25 0700 115/67 -- -- 62 22 100 % --   03/28/25 0542 -- -- -- -- -- -- 86.7 kg (191 lb 3.2 oz)   03/28/25 0500 (!) 93/53 -- -- (!) 43 -- 99 % --   03/28/25 0400 (!) 95/45 98.1 °F (36.7 °C) Oral 55 -- 99 % --   03/28/25 0300 (!) 109/47 -- -- 51 -- 100 % --   03/28/25 0100 (!) 116/50 -- -- -- -- -- --   03/28/25 0000 93/78 98.1 °F (36.7 °C) Oral 60 18 98 % --   03/27/25 2200 (!) 118/27 -- -- -- -- -- --   03/27/25 2100 98/76 -- -- -- -- -- --   03/27/25 2000 (!) 119/49 97.7 °F (36.5 °C) Oral 68 18 98 % --   03/27/25 1900 (!) 117/48 -- -- 55 13 99 % --       Intake/Output Summary (Last 24 hours) at 3/28/2025 1853  Last data filed at 3/28/2025 1600  Gross per 24 hour   Intake 963.65 ml   Output 3675 ml   Net -2711.35 ml       Recent Labs     03/26/25  1356 03/27/25  0418 03/28/25  0847   WBC  --  4.7 5.6   HGB 7.9* 7.7* 7.9*   HCT 24.4* 23.8* 23.7*   MCV  --  100.8 100.4   PLT  --  112* 110*     Recent Labs 
                                Ruel Negro MD   Urology Progress Note            Subjective: Follow-up urinary retention bladder outlet obstruction    Patient Vitals for the past 24 hrs:   BP Temp Temp src Pulse Resp SpO2   03/31/25 0614 (!) 144/50 -- -- 77 14 96 %   03/31/25 0500 -- -- -- 65 14 94 %   03/31/25 0400 -- 97.8 °F (36.6 °C) Oral 79 21 95 %   03/31/25 0300 (!) 135/57 -- -- 67 16 99 %   03/31/25 0200 110/80 -- -- 62 19 94 %   03/31/25 0100 (!) 134/48 -- -- 54 14 96 %   03/31/25 0000 (!) 120/42 98.3 °F (36.8 °C) Oral 52 14 95 %   03/30/25 2300 (!) 139/123 -- -- 84 18 98 %   03/30/25 2200 (!) 149/37 -- -- 64 12 93 %   03/30/25 2100 (!) 131/39 -- -- 59 12 98 %   03/30/25 2000 (!) 123/53 98.7 °F (37.1 °C) Oral 57 13 98 %   03/30/25 1900 92/69 -- -- 63 16 99 %   03/30/25 1500 -- 97.6 °F (36.4 °C) Oral -- -- --   03/30/25 1200 -- 98.2 °F (36.8 °C) Oral -- -- --   03/30/25 0800 -- 98.1 °F (36.7 °C) Oral -- -- --       Intake/Output Summary (Last 24 hours) at 3/31/2025 0651  Last data filed at 3/31/2025 0615  Gross per 24 hour   Intake 156.52 ml   Output 2200 ml   Net -2043.48 ml       Recent Labs     03/29/25  0259 03/30/25  0318 03/30/25  1307   WBC 4.5 5.3 6.0   HGB 7.5* 7.5* 8.3*   HCT 23.0* 23.0* 25.4*   .2 101.3 102.8   * 100* 105*     Recent Labs     03/29/25  0259 03/30/25  0318    138   K 4.4 4.2   * 106   CO2 21 22   BUN 55* 41*   CREATININE 1.3* 1.2       No results for input(s): \"COLORU\", \"PHUR\", \"LABCAST\", \"WBCUA\", \"RBCUA\", \"MUCUS\", \"TRICHOMONAS\", \"YEAST\", \"BACTERIA\", \"CLARITYU\", \"SPECGRAV\", \"LEUKOCYTESUR\", \"UROBILINOGEN\", \"BILIRUBINUR\", \"BLOODU\" in the last 72 hours.    Invalid input(s): \"NITRATE\", \"GLUCOSEUKETONESUAMORPHOUS\"    Additional Lab/culture results:    Physical Exam: Hidalgo catheter removed for void trial       patient alert oriented not in acute distress, renal function continues to trend down, presently on heparin drip  Patient with bladder outlet obstruction 
  Physician Progress Note      PATIENT:               JUAN MONTOYA  CSN #:                  [id number]  :                       1940  ADMIT DATE:       3/25/2025 12:42 PM  DISCH DATE:  RESPONDING  PROVIDER #:        Joey Espitia MD          QUERY TEXT:    Patient admitted with acute on chronic diastolic CHF.  Patient noted to have   Troponin high sensitivity 73,68.   If possible, please document in the   progress notes and discharge summary if you are evaluating and/or treating any   of the following:    The medical record reflects the following:    Risk Factors: Acute on chronic CHF  Clinical Indicators: presents with SOB, fatigue, Trop HS 73, 68  Treatment: IV Lasix, Spiraldactone, Lipitor  Options provided:  -- Type 2 MI due to acute on chronic diastolic CHF  -- Demand Ischemia due to acute on chronic diastolic CHF  -- Other - I will add my own diagnosis  -- Disagree - Not applicable / Not valid  -- Disagree - Clinically unable to determine / Unknown  -- Refer to Clinical Documentation Reviewer    PROVIDER RESPONSE TEXT:    This patient has a Type 2 MI due to acute on chronic diastolic CHF.    Query created by: Marc Marie on 3/26/2025 4:06 PM      Electronically signed by:  Joey Espitia MD 3/26/2025 6:11 PM          
[facility]   Progress Note                   Date:   3/27/2025  Patient name: Grupo Pérez  Date of admission:  3/25/2025 12:42 PM  MRN:   [id number]  YOB: 1940  PCP: Roger Mcmullen MD    Reason for Admission: Acute on chronic diastolic heart failure (HCC) [I50.33]  NSTEMI (non-ST elevated myocardial infarction) (HCC) [I21.4]  Acute kidney injury [N17.9]  Acute decompensated heart failure (HCC) [I50.9]    Subjective:       Clinical Changes / Abnormalities: Pt seen and examined in the room.  Patient resting in bed. Pt denies any CP or sob. Slightly disoriented this AM. Labs, vitals and tele reviewed- A-fib 54    Seen in office last week by Dr. Burns and again yesterday by NP (SACHA) and sent to ER:  Assessment / Acute Cardiac Problems/Plan   - Severe aortic stenosis and regurgitation status post TAVR using 29 mm Satnam S3 valve.  Last echocardiogram reviewed.  His shortness of breath is better however he still have 3+ swelling in bilateral lower extremities.  It looks more related to chronic venous stasis. Pt does follow  with Dr. Suarez for vascular surgery.  Pt was switched to bumex, but was unable to tolerate and was started back on lasix by wife.      - DAMARIS. Creat 2.0, up from 1.0 the prior week.      - Anemia.  Pt very pale in office today.  Denies any bleeding.  HGB 8.2 on Friday, down almost 2 grams.  Currently on eliquis 5mg BID and asa.  No recent GI workup      - Dizziness, secondary to low HGB.       -Extensive conversation with pt and family > 45 minutes discussing changes.  At this time, recommend going to ER to be evaluated.  Will need updated labs and then further workup if indicated,  After lengthy discussion, pt is now agreeable to go to [facility] ER today for further testing.  Advised family to call office if pt is discharged from the ER and further medication changes would be made.    Reviewed with Dr. Burns      Medications:   Scheduled Meds:   furosemide  20 mg 
[facility]   Progress Note                   Date:   3/30/25  Patient name: Grupo Pérez  Date of admission:  3/25/2025 12:42 PM  MRN:   [id number]  YOB: 1940  PCP: Roger Mcmullen MD    Reason for Admission:     Subjective:       Clinical Changes / Abnormalities:  Pt resting comfortably; denies CP, SOB or palpitation. Telemetry shows AF, 64 bpm.  Serum Hb up to 8.3 today; pt continues on IV Protonix.      Medications:   Scheduled Meds:   pantoprazole (PROTONIX) 40 mg in sodium chloride (PF) 0.9 % 10 mL injection  40 mg IntraVENous Q12H    lidocaine  1 patch TransDERmal Daily    aspirin  81 mg Oral Daily    atorvastatin  10 mg Oral Daily    timolol  1 drop Both Eyes Daily    tamsulosin  0.4 mg Oral Daily    sodium chloride flush  5-40 mL IntraVENous 2 times per day     Continuous Infusions:   heparin (PORCINE) Infusion 11 Units/kg/hr (03/30/25 1346)    sodium chloride      sodium chloride       CBC:   Recent Labs     03/29/25  0259 03/30/25  0318 03/30/25  1307   WBC 4.5 5.3 6.0   HGB 7.5* 7.5* 8.3*   * 100* 105*     BMP:    Recent Labs     03/28/25  0528 03/29/25  0259 03/30/25  0318    140 138   K 4.7 4.4 4.2    108* 106   CO2 21 21 22   BUN 73* 55* 41*   CREATININE 2.0* 1.3* 1.2   GLUCOSE 94 115 120*     Hepatic: No results for input(s): \"AST\", \"ALT\", \"BILITOT\", \"ALKPHOS\" in the last 72 hours.    Invalid input(s): \"ALB\"  Troponin: No results for input(s): \"TROPONINI\" in the last 72 hours.  BNP: No results for input(s): \"BNP\" in the last 72 hours.  Lipids: No results for input(s): \"CHOL\", \"HDL\" in the last 72 hours.    Invalid input(s): \"LDLCALCU\"  INR:   Recent Labs     03/30/25  1307   INR 1.3       Objective:   Vitals: BP (!) 135/57   Pulse 79   Temp 97.8 °F (36.6 °C) (Oral)   Resp 21   Ht 1.803 m (5' 11\")   Wt 86.7 kg (191 lb 3.2 oz)   SpO2 95%   BMI 26.67 kg/m²   General appearance: alert and cooperative with exam  HEENT: Head: Normocephalic, no 
[facility]   Progress Note                   Date:   3/31/2025  Patient name: Grupo Pérez  Date of admission:  3/25/2025 12:42 PM  MRN:   [id number]  YOB: 1940  PCP: Roger Mcmullen MD    Reason for Admission: Acute on chronic diastolic heart failure (HCC) [I50.33]  NSTEMI (non-ST elevated myocardial infarction) (HCC) [I21.4]  Acute kidney injury [N17.9]  Acute decompensated heart failure (HCC) [I50.9]    Subjective:       Clinical Changes / Abnormalities: Pt seen and examined in the room.  Patient resting in bed. Pt denies any CP or sob.  Labs, vitals and tele reviewed- A-fib 66    Medications:   Scheduled Meds:   pantoprazole (PROTONIX) 40 mg in sodium chloride (PF) 0.9 % 10 mL injection  40 mg IntraVENous Q12H    lidocaine  1 patch TransDERmal Daily    aspirin  81 mg Oral Daily    atorvastatin  10 mg Oral Daily    timolol  1 drop Both Eyes Daily    tamsulosin  0.4 mg Oral Daily    sodium chloride flush  5-40 mL IntraVENous 2 times per day     Continuous Infusions:   heparin (PORCINE) Infusion 11 Units/kg/hr (03/31/25 0802)    sodium chloride      sodium chloride       CBC:   Recent Labs     03/29/25  0259 03/30/25 0318 03/30/25  1307   WBC 4.5 5.3 6.0   HGB 7.5* 7.5* 8.3*   * 100* 105*     BMP:    Recent Labs     03/29/25 0259 03/30/25 0318    138   K 4.4 4.2   * 106   CO2 21 22   BUN 55* 41*   CREATININE 1.3* 1.2   GLUCOSE 115 120*     Hepatic: No results for input(s): \"AST\", \"ALT\", \"BILITOT\", \"ALKPHOS\" in the last 72 hours.    Invalid input(s): \"ALB\"  Troponin: No results for input(s): \"TROPHS\" in the last 72 hours.  BNP: No results for input(s): \"BNP\" in the last 72 hours.  Lipids: No results for input(s): \"CHOL\", \"HDL\" in the last 72 hours.    Invalid input(s): \"LDLCALCU\"  INR:   Recent Labs     03/30/25  1307   INR 1.3       Objective:   Vitals: BP (!) 144/50   Pulse 77   Temp 97.8 °F (36.6 °C) (Oral)   Resp 14   Ht 1.803 m (5' 11\")   Wt 86.7 
Discharge teaching and instructions completed with patient using teachback method. AVS reviewed. Printed prescriptions given to patient. Patient voiced understanding regarding prescriptions, follow up appointments, and care of self at home. Discharged home with family. All questions answered.    
End Of Shift Note  [facility] ICU  Summary of shift: Patient had uneventful shift. Patient A/Ox4. Patient had 1250 UOP.     Vitals:    Vitals:    03/28/25 2300 03/29/25 0000 03/29/25 0400 03/29/25 0618   BP: (!) 123/46 (!) 120/55 (!) 131/45    Pulse: (!) 49 54 59 62   Resp: 12 30 17 21   Temp:  98.4 °F (36.9 °C) 97.8 °F (36.6 °C)    TempSrc:  Oral Oral    SpO2: 96% 97% 96% 97%   Weight:       Height:            I&O:   Intake/Output Summary (Last 24 hours) at 3/29/2025 0619  Last data filed at 3/29/2025 0400  Gross per 24 hour   Intake 1546.15 ml   Output 2525 ml   Net -978.85 ml       Resp Status: Room Air    Ventilator Settings:     / / /     Critical Care IV infusions:   sodium chloride      sodium chloride          LDA:   Peripheral IV 03/25/25 Left;Anterior Forearm (Active)   Number of days: 3       Peripheral IV 03/26/25 Left Cephalic (Active)   Number of days: 3       Urinary Catheter 03/27/25 (Active)   Number of days: 1       Incision 07/24/13 Leg Right;Anterior;Medial (Active)   Number of days: 4265       Incision 07/25/13 Shoulder (Active)   Number of days: 4264          
End Of Shift Note  [place] ICU  Summary of shift: Patient AxOx4 for the end of the shift. Hidalgo in place with great urine output. Patient is finishing up the second 1/2 of drink bowel prep. Stool is still brown     Vitals:    Vitals:    03/28/25 0300 03/28/25 0400 03/28/25 0500 03/28/25 0542   BP: (!) 109/47 (!) 95/45 (!) 93/53    Pulse: 51 55 (!) 43    Resp:       Temp:  98.1 °F (36.7 °C)     TempSrc:  Oral     SpO2: 100% 99% 99%    Weight:    86.7 kg (191 lb 3.2 oz)   Height:            I&O:   Intake/Output Summary (Last 24 hours) at 3/28/2025 0620  Last data filed at 3/28/2025 0400  Gross per 24 hour   Intake --   Output 3600 ml   Net -3600 ml       Resp Status: RA    Ventilator Settings:     / / /     Critical Care IV infusions:   sodium chloride      sodium chloride          LDA:   Peripheral IV 03/25/25 Left;Anterior Forearm (Active)   Number of days: 2       Peripheral IV 03/26/25 Left Cephalic (Active)   Number of days: 2       Urinary Catheter 03/27/25 (Active)   Number of days: 0       Incision 07/24/13 Leg Right;Anterior;Medial (Active)   Number of days: 4264       Incision 07/25/13 Shoulder (Active)   Number of days: 4263          
End Of Shift Note  [place] ICU  Summary of shift: Patient had poor sleep throughout the night. At 0030 HBG came back at 6.6, 1 unit of PRBC given. Writer stayed in room for the first 15 min of blood administration. No reaction observed  Patient had severe back pain, percocet, morphine and robaxin given to patient. Robaxin relieved patients pain     Vitals:    Vitals:    03/26/25 0400 03/26/25 0500 03/26/25 0516 03/26/25 0600   BP: (!) 104/44 (!) 115/29  (!) 129/33   Pulse: 55 51  55   Resp: 14 10  14   Temp: 97.8 °F (36.6 °C)      TempSrc: Oral      SpO2: 93% 97%  93%   Weight:   86.5 kg (190 lb 9.6 oz)    Height:            I&O:   Intake/Output Summary (Last 24 hours) at 3/26/2025 0618  Last data filed at 3/26/2025 0400  Gross per 24 hour   Intake --   Output 350 ml   Net -350 ml       Resp Status: RA    Ventilator Settings:     / / /     Critical Care IV infusions:   sodium chloride      sodium chloride          LDA:   Peripheral IV 03/25/25 Left;Anterior Forearm (Active)   Number of days: 0       Peripheral IV 03/26/25 Left Cephalic (Active)   Number of days: 0       External Urinary Catheter (Active)   Number of days: 0       Incision 07/24/13 Leg Right;Anterior;Medial (Active)   Number of days: 4262       Incision 07/25/13 Shoulder (Active)   Number of days: 4261          
End Of Shift Note  [place] ICU  Summary of shift: Patient alert and oriented x3, forgetful to situation, restless at times, improved with wife at bedside. He was in chair for 1.5 hrs today tolerated well. His sp02 was 89%, 2L 02 applied. CXR done. He was on RA 2 hours later without shortness of breath. Cardiology plans to start anticoagulation tomorrow. Did have BM, no sign of bleeding. Adequate urine output.     Vitals:    Vitals:    03/29/25 1500 03/29/25 1600 03/29/25 1700 03/29/25 1800   BP: (!) 112/49 (!) 116/42 (!) 116/52 113/63   Pulse: 63 65 61 62   Resp: 14 17 16 14   Temp: 98.7 °F (37.1 °C)      TempSrc: Oral      SpO2: 100% 98% 100% 100%   Weight:       Height:            I&O:   Intake/Output Summary (Last 24 hours) at 3/29/2025 1851  Last data filed at 3/29/2025 1758  Gross per 24 hour   Intake 1502.5 ml   Output 2200 ml   Net -697.5 ml       Resp Status: RA    Ventilator Settings:     / / /     Critical Care IV infusions:   sodium chloride      sodium chloride          LDA:   Peripheral IV 03/25/25 Left;Anterior Forearm (Active)   Number of days: 4       Peripheral IV 03/26/25 Left Cephalic (Active)   Number of days: 3       Urinary Catheter 03/27/25 (Active)   Number of days: 2       Incision 07/24/13 Leg Right;Anterior;Medial (Active)   Number of days: 4266       Incision 07/25/13 Shoulder (Active)   Number of days: 4265         
End Of Shift Note  [place] ICU  Summary of shift: Patient had uneventful shift. Patient remains on heparin gtt @11 units. Patient possible discharge today.     Vitals:    Vitals:    03/31/25 0200 03/31/25 0300 03/31/25 0400 03/31/25 0614   BP: 110/80 (!) 135/57  (!) 144/50   Pulse: 62 67 79 77   Resp: 19 16 21 14   Temp:   97.8 °F (36.6 °C)    TempSrc:   Oral    SpO2: 94% 99% 95% 96%   Weight:       Height:            I&O:   Intake/Output Summary (Last 24 hours) at 3/31/2025 0615  Last data filed at 3/31/2025 0615  Gross per 24 hour   Intake 156.52 ml   Output 2200 ml   Net -2043.48 ml       Resp Status: Room Air    Ventilator Settings:     / / /     Critical Care IV infusions:   heparin (PORCINE) Infusion 11 Units/kg/hr (03/31/25 0615)    sodium chloride      sodium chloride          LDA:   Peripheral IV 03/30/25 Right;Anterior Forearm (Active)   Number of days: 0       Urinary Catheter 03/27/25 (Active)   Number of days: 3       Incision 07/24/13 Leg Right;Anterior;Medial (Active)   Number of days: 4267       Incision 07/25/13 Shoulder (Active)   Number of days: 4266          
End Of Shift Note  [place] ICU  Summary of shift: Patient has had low bp and hr's through out shift. Cardiology notified, no new orders. GI Consult put in place. Bp meds held. Pt medicated for chronic back pain no further needs. Call light in reach checked on for safety.    Vitals:    Vitals:    03/26/25 0944 03/26/25 1148 03/26/25 1530 03/26/25 1758   BP: (!) 116/41   (!) 122/36   Pulse:       Resp:       Temp:  97.9 °F (36.6 °C) 97.6 °F (36.4 °C)    TempSrc:  Oral Oral    SpO2:       Weight:       Height:            I&O:   Intake/Output Summary (Last 24 hours) at 3/26/2025 1859  Last data filed at 3/26/2025 1801  Gross per 24 hour   Intake 127 ml   Output 800 ml   Net -673 ml       Resp Status: RA    Ventilator Settings:     / / /     Critical Care IV infusions:   sodium chloride      sodium chloride          LDA:   Peripheral IV 03/25/25 Left;Anterior Forearm (Active)   Number of days: 1       Peripheral IV 03/26/25 Left Cephalic (Active)   Number of days: 0       External Urinary Catheter (Active)   Number of days: 0       Incision 07/24/13 Leg Right;Anterior;Medial (Active)   Number of days: 4263       Incision 07/25/13 Shoulder (Active)   Number of days: 4262         
End Of Shift Note  [place] ICU  Summary of shift: Patient alert and oriented x4 occasionally forgetful. Pt did have EGD and colonoscopy in afternoon. Started on d5.9 NS at 50 ml/hr for 12 hours only, to end approximately 0355 Am. Urology rounded, keep fajardo. Regular diet restarted per GI, modified for a renal diet per nephro. US liver and US kidney done.     Vitals:    Vitals:    03/28/25 1545 03/28/25 1600 03/28/25 1700 03/28/25 1800   BP: (!) 115/53 (!) 112/58 (!) 103/45 (!) 113/58   Pulse: 59 56 54 66   Resp: 15 14 19 12   Temp:  97.7 °F (36.5 °C)     TempSrc:  Oral     SpO2: 97% 97% 97% 94%   Weight:       Height:            I&O:   Intake/Output Summary (Last 24 hours) at 3/28/2025 1820  Last data filed at 3/28/2025 1600  Gross per 24 hour   Intake 963.65 ml   Output 3675 ml   Net -2711.35 ml       Resp Status: RA    Ventilator Settings:     / / /     Critical Care IV infusions:   dextrose 5 % and 0.9 % NaCl 50 mL/hr at 03/28/25 1600    sodium chloride      sodium chloride          LDA:   Peripheral IV 03/25/25 Left;Anterior Forearm (Active)   Number of days: 3       Peripheral IV 03/26/25 Left Cephalic (Active)   Number of days: 2       Urinary Catheter 03/27/25 (Active)   Number of days: 1       Incision 07/24/13 Leg Right;Anterior;Medial (Active)   Number of days: 4265       Incision 07/25/13 Shoulder (Active)   Number of days: 4264         
End Of Shift Note  [place] [facility] ICU  Summary of shift: Patient was A/Ox4 at the start of shift. Patient became confused around 2130 asking about leaving to go to an appointment on 3/31. Patient was reoriented to place and situation. Patient had 1150 UOP.     Vitals:    Vitals:    03/30/25 0300 03/30/25 0400 03/30/25 0500 03/30/25 0600   BP: 105/83 (!) 103/32 (!) 95/52 (!) 106/53   Pulse: 56 58 57 59   Resp: 13 17 13 13   Temp:  97.9 °F (36.6 °C)     TempSrc:  Oral     SpO2: 96% 96%  92%   Weight:       Height:            I&O:   Intake/Output Summary (Last 24 hours) at 3/30/2025 0623  Last data filed at 3/30/2025 0537  Gross per 24 hour   Intake 920 ml   Output 2100 ml   Net -1180 ml       Resp Status: Room Air    Ventilator Settings:     / / /     Critical Care IV infusions:   sodium chloride      sodium chloride          LDA:   Peripheral IV 03/25/25 Left;Anterior Forearm (Active)   Number of days: 4       Peripheral IV 03/26/25 Left Cephalic (Active)   Number of days: 4       Urinary Catheter 03/27/25 (Active)   Number of days: 2       Incision 07/24/13 Leg Right;Anterior;Medial (Active)   Number of days: 4266       Incision 07/25/13 Shoulder (Active)   Number of days: 4265          
[facility]  Office: [phone]  Jorge Loredo DO, Magnus Rivers DO, Bubba Graham DO, Hector Butler DO, Joey Espitia MD, Loraine Torres MD, Natalie Uriarte MD, Estrellita Rice MD,  James Wilhelm MD, Maddie Mccloud MD, Ryan Hu MD,  Zelda Armenta DO, Beverley Ardon MD, Asa Rocha MD, Sravan Loredo DO, Elizabeth Alegre MD,  Rik Shah DO, Navya Thompson MD, Moni Riley MD, Eve Sue MD,  David Edwards MD, Evan Giron MD, Aidee Deleon MD, Cherise Vallejo MD, Godwin Chung MD, Reggie Padgett MD, Vignesh Tran DO, Jarod Padron MD, Zelda Welch MD, Mohsin Reza, MD, Shirley Waterhouse, CNP,  Mary Dumont, CNP, Vignesh Cooper, CNP,  Nuha Wong, DNP, Ana María Anne, CNP, Angy Carrillo, CNP, Danyelle Storey, CNP, Cristina Florentino, CNP, ROSETTE Pina-C, Shilpa Munoz, CNP, Angel Valdivia, CNP,  Emmy Alonso, CNP, Tara Toro, CNP, Olga Bashir, CNP,  Avis Fall, CNS, Krystina Pradhan, CNP, Rylie Leger, CNP,   Fiorella Chance, CNP         [facility]   IN-PATIENT SERVICE   [facility]    Progress Note    3/26/2025    12:22 PM    Name:   Grupo Pérez  MRN:     [id number]     Acct:      [id number]   Room:   [address] [place] [address] Day:  1  Admit Date:  3/25/2025 12:42 PM    PCP:   Roger Mcmullen MD  Code Status:  Full Code    Subjective:     C/C:   Chief Complaint   Patient presents with    Abnormal Lab     Interval History Status: .   Still complains of generalized fatigue and slight exertional dyspnea  Denies chest pain/cough/palpitation    Brief History:     Grupo Pérez is a 84 y.o. Non- / non  male who presents with Abnormal Lab   and is admitted to the hospital for the management of Acute decompensated heart failure (HCC).     This 84-year-old male is admitted for management of acute decompensated heart failure.  He has a very complex cardiac history, including recent admission at [facility] 
Initiated fajardo as ordered urine upon placement noted, 1200 total return  
KHANG Alonso NP notified of patients HR 36-60's and SBP for patient while sleeping is 80's-90's and diastolic is 30's.  Patient is not symptomatic. Orders to monitor and reach out if HR sustains below 40   
KHANG Alonso NP notified of patients critical 6.6 HBG at 0032. New orders for type and screen and 1 unit PRBC  
Occupational Therapy  [facility]  Occupational Therapy Not Seen    DATE: 3/28/2025    NAME: Grupo Pérez  MRN: [id number]   : 1940    Patient not seen this date for Occupational Therapy due to:      [] Cancel by RN or physician due to:    [] Hemodialysis    [] Critical Lab Value Level     [] Blood transfusion in progress    [] Acute or unstable cardiovascular status   _MAP < 55 or more than >115  _HR < 40 or > 130    [] Acute or unstable pulmonary status   -FiO2 > 60%   _RR < 5 or >40    _O2 sats < 85%    [] Strict Bedrest    [x] Off Unit for surgery or procedure (EGD/colonoscopy)     [] Off Unit for testing       [] Pending imaging to R/O fracture    [] Refusal by Patient      [] Other      [] OT being discontinued at this time. Patient independent. No further needs.     [] OT being discontinued at this time as the patient has been transferred to hospice care. No further needs.      ADELAIDA TERRY   
Occupational Therapy Initial Evaluation  Facility/Department: [facility] ICU   Patient Name: Grupo Pérez        MRN: [id number]    : 1940    Date of Service: 3/26/2025    CHANDNI Cordoba reports patient is medically stable for therapy treatment this date.  Chart reviewed prior to treatment and patient is agreeable for therapy.  All lines intact and patient positioned comfortably at end of treatment.  All patient needs addressed prior to ending therapy session.       Discharge Recommendations  Discharge Recommendations: Patient would benefit from continued therapy after discharge  OT Equipment Recommendations  Equipment Needed:  (CTA)    Chief Complaint   Patient presents with    Abnormal Lab       Per H&P: Grupo Pérez is a 84 y.o. Non- / non  male who presents with Abnormal Lab and is admitted to the hospital for the management of Acute decompensated heart failure (HCC).     This 84-year-old male is admitted for management of acute decompensated heart failure.  He has a very complex cardiac history, including recent admission at [facility] for TAVR procedure on 3/4/2025.  Echocardiogram on 3/5/2025 demonstrated EF of 65% as well as severe tricuspid regurgitation.  At that time, patient's blood pressure medications were adjusted.  Recently, he saw his cardiologist who put him on Bumex for lower extremity swelling.  The swelling continued to worsen despite this, and he subsequently presented to outlying facility emergency department with complaints of increased peripheral edema, ambulatory dysfunction, fatigue, shortness of breath.  Patient reportedly restarted his oral Lasix approximately 3 days ago, however has not seen any improvement.  Daughter at bedside expresses concern for increasing pallor and worsening anemia.  Patient denies any blood in his stool, hematuria, or active bleeding that he is aware of.  Due to his chronic anticoagulation, patient does state that he 
Occupational Therapy Re-Evaluation  Facility/Department: [facility] ICU   Patient Name: Grupo Pérez        MRN: [id number]    : 1940    Date of Service: 3/27/2025    CHANDNI Cordoba reports patient is medically stable for therapy treatment this date.    Chart reviewed prior to treatment and patient is agreeable for therapy.  All lines intact and patient positioned comfortably at end of treatment.  All patient needs addressed prior to ending therapy session.      Pt currently functioning below baseline.  Recommend daily inpatient skilled therapy at time of discharge to maximize long term outcomes and prevent re-admission. Please refer to AM-PAC score for current level of function.      Discharge Recommendations  Discharge Recommendations: Patient would benefit from continued therapy after discharge  OT Equipment Recommendations  Equipment Needed:  (CTA)    Chief Complaint   Patient presents with    Abnormal Lab       Per H&P: Grupo Pérez is a 84 y.o. Non- / non  male who presents with Abnormal Lab and is admitted to the hospital for the management of Acute decompensated heart failure (HCC).     This 84-year-old male is admitted for management of acute decompensated heart failure.  He has a very complex cardiac history, including recent admission at [facility] for TAVR procedure on 3/4/2025.  Echocardiogram on 3/5/2025 demonstrated EF of 65% as well as severe tricuspid regurgitation.  At that time, patient's blood pressure medications were adjusted.  Recently, he saw his cardiologist who put him on Bumex for lower extremity swelling.  The swelling continued to worsen despite this, and [facility] with complaints of increased peripheral edema, ambulatory dysfunction, fatigue, shortness of breath.  Patient reportedly restarted his oral Lasix approximately 3 days ago, however has not seen any improvement.  Daughter at 
Patient is starting to develop delirium writer reached out to KATIE Leger NP. Orders received for melatonin   
Patient's wife spoke with Dr Negro. Plan is for patient to discharge with fajardo and follow up with Dr Negro on Wednesday  
Physical Therapy  DATE: 3/28/2025    NAME: Grupo Pérez  MRN: [id number]   : 1940    Patient not seen this date for Physical Therapy due to:      [] Cancel by RN or physician due to:    [] Hemodialysis    [] Critical Lab Value Level     [] Blood transfusion in progress    [] Acute or unstable cardiovascular status   _MAP < 55 or more than >115  _HR < 40 or > 130    [] Acute or unstable pulmonary status   -FiO2 > 60%   _RR < 5 or >40    _O2 sats < 85%    [] Strict Bedrest    [x] Off Unit for surgery or procedure (EGD/colonoscopy)     [] Off Unit for testing       [] Pending imaging to R/O fracture    [] Refusal by Patient      [] Other      [] PT being discontinued at this time. Patient independent. No further needs.     [] PT being discontinued at this time as the patient has been transferred to hospice care. No further needs.      Katrina Vazquez, PTA     
Physical Therapy  DATE: 3/31/2025    NAME: Grupo Pérez  MRN: [id number]   : 1940    Patient not seen this date for Physical Therapy due to:      [] Cancel by RN or physician due to:    [] Hemodialysis    [] Critical Lab Value Level     [] Blood transfusion in progress    [] Acute or unstable cardiovascular status   _MAP < 55 or more than >115  _HR < 40 or > 130    [] Acute or unstable pulmonary status   -FiO2 > 60%   _RR < 5 or >40    _O2 sats < 85%    [] Strict Bedrest    [] Off Unit for surgery or procedure    [] Off Unit for testing       [] Pending imaging to R/O fracture    [] Refusal by Patient      [x] Other: Attempted in morning, patient declined as he already had OT this morning, did request OP PT locations. Patient given list of OP facilities. Checked again in the afternoon and RN reported she was getting ready to discharge patient.     [] PT being discontinued at this time. Patient independent. No further needs.     [] PT being discontinued at this time as the patient has been transferred to hospice care. No further needs.      Janki Goodson, PT     
Physical Therapy  Facility/Department: [facility] ICU   Physical Therapy Initial Evaluation    Patient Name: Grupo Pérez        MRN: [id number]    : 1940    Date of Service: 3/26/2025    Chief Complaint   Patient presents with    Abnormal Lab     Past Medical History:  has a past medical history of Acute decompensated heart failure (HCC), Arterial thrombosis (HCC), Arthritis, Blood circulation, collateral, Bradycardia, CAD (coronary artery disease), Former smoker, GERD (gastroesophageal reflux disease), History of cardiac cath, Hyperlipidemia, Hypertension, and S/P transesophageal echocardiogram ([facility]).  Past Surgical History:  has a past surgical history that includes joint replacement; hernia repair; Tonsillectomy; Endoscopy, colon, diagnostic; Colonoscopy; Cardiac procedure (N/A, 2025); Cardiac procedure (N/A, 2025); invasive vascular (N/A, 2025); thrombectomy (Right, 2013); joint replacement (); Total hip arthroplasty (); Cardiac procedure (N/A, 3/4/2025); neurovascular procedure (N/A, 3/4/2025); and Cardiac surgery (N/A, 3/4/2025).    Discharge Recommendations  Discharge Recommendations: Patient would benefit from continued therapy after discharge, Continue to assess pending progress       Assessment  Body Structures, Functions, Activity Limitations Requiring Skilled Therapeutic Intervention: Decreased functional mobility , Decreased endurance, Decreased balance, Decreased safe awareness, Decreased strength    Assessment: Skilled therapy needed to maximize independence with functional mobility as well as improve endurance, balance and overall safety. Limited assessment performed due to low BP in supine (90/22), patient Mod A for rolling, reassess needed for gait and transfers.    Therapy Prognosis: Good  Decision Making: Medium Complexity  Requires PT Follow-Up: Yes  Activity Tolerance  Activity Tolerance: Other (comment)  Activity Tolerance Comments: Supine BP 90/22 so 
Physical Therapy  Facility/Department: [facility]  Physical Therapy Re-Eval    Name: Grupo Pérez  : 1940  MRN: [id number]  Date of Service: 3/27/2025    Per H&P:84 y.o. Non- / non  male who presents with Abnormal Lab  and is admitted to the hospital for the management of Acute decompensated heart failure (HCC).  This 84-year-old male is admitted for management of acute decompensated heart failure.  He has a very complex cardiac history, including recent admission at [facility] for TAVR procedure on 3/4/2025.  Echocardiogram on 3/5/2025 demonstrated EF of 65% as well as severe tricuspid regurgitation.  At that time, patient's blood pressure medications were adjusted.  Recently, he saw his cardiologist who put him on Bumex for lower extremity swelling.  The swelling continued to worsen despite this, and he subsequently presented to [facility] facility emergency department with complaints of increased peripheral edema, ambulatory dysfunction, fatigue, shortness of breath.  Patient reportedly restarted his oral Lasix approximately 3 days ago, however has not seen any improvement.  Daughter at bedside expresses concern for increasing pallor and worsening anemia.  Patient denies any blood in his stool, hematuria, or active bleeding that he is aware of.  Due to his chronic anticoagulation, patient does state that he bleeds and bruises easily.  He reports generalized fatigue and weakness, however denies any fevers, chills, chest pain, cough, palpitations, abdominal pain, nausea, vomiting.  No other reported concerns at this time.    CHANDNI Cordoba reports patient is medically stable for therapy treatment this date.    Chart reviewed prior to treatment and patient is agreeable for therapy.  All lines intact and patient positioned comfortably at end of treatment.  All patient needs addressed prior to ending therapy session.     Discharge Recommendations:  Patient would benefit from 
Renal Progress Note    Patient :  Grupo Pérez; 84 y.o. MRN# [id number]  Location:  1104/1104-01  Attending:  Joey Espitia MD  Admit Date:  3/25/2025   Hospital Day: 3    Subjective:     Patient was seen and examined.  No new issues reported overnight.  BMP results from today reviewed sodium 139, potassium 4.7, chloride 1/1/2006, bicarb 21, calcium 8.5, BUN 73, creatinine did improve to 2.0 mg/dl.  Hemoglobin 7.9.  Patient will be going for colonoscopy and EGD today.  Currently has a Hidalgo catheter in place and urine output documented as about 3.6 L in the last 24 hours.  Okay did have some hypotension overnight with systolics as low as high 70s, now most recent blood pressure 143/43, heart rate 63.    Outpatient Medications:     Medications Prior to Admission: docusate sodium (COLACE) 100 MG capsule, Take 1 capsule by mouth nightly  furosemide (LASIX) 40 MG tablet, Take 1 tablet by mouth in the morning and 1 tablet in the evening.  apixaban (ELIQUIS) 5 MG TABS tablet, Take 1 tablet by mouth 2 times daily  aspirin 81 MG chewable tablet, Take 1 tablet by mouth daily  oxyCODONE-acetaminophen (PERCOCET) 5-325 MG per tablet, Take 1 tablet by mouth every 6 hours as needed for Pain.  losartan (COZAAR) 100 MG tablet, Take 1 tablet by mouth daily  amLODIPine (NORVASC) 5 MG tablet, Take 1 tablet by mouth daily  spironolactone (ALDACTONE) 25 MG tablet, Take 0.5 tablets by mouth daily  tamsulosin (FLOMAX) 0.4 MG capsule, Take 1 capsule by mouth  simvastatin (ZOCOR) 20 MG tablet, take 1 tablet by mouth at bedtime  timolol (TIMOPTIC) 0.5 % ophthalmic solution, Place 1 drop into both eyes daily  bumetanide (BUMEX) 1 MG tablet, Take 1 tablet by mouth 2 times daily (Patient not taking: Reported on 3/25/2025)  [DISCONTINUED] clotrimazole-betamethasone (LOTRISONE) 1-0.05 % cream, Apply topically 2 times daily Apply topically 2 times daily. (Patient not taking: Reported on 3/25/2025)  [DISCONTINUED] meclizine (ANTIVERT) 12.5 
Spiritual Health History and Assessment/Progress Note  [facility]    (P) Spiritual/Emotional Needs,  ,  ,      Name: Grupo Pérez MRN: [id number]    Age: 84 y.o.     Sex: male   Language: English   Muslim: Gnosticist   Acute decompensated heart failure (HCC)     Date: 3/27/2025            Total Time Calculated: (P) 15 min              Spiritual Assessment began in [facility] ICU        Referral/Consult From: (P) Rounding   Encounter Overview/Reason: (P) Spiritual/Emotional Needs  Service Provided For: (P) Patient    Patient engaged slowly displaying some confusion and lessened resilience. Patient stated doing \"just okay.\" Patient's nurse expressed patient had a restless night with some possible hallucinations.  provided a supportive presence and compassionate listening and connection.    Patricia, Belief, Meaning:   Patient identifies as spiritual  Family/Friends No family/friends present      Importance and Influence:  Patient has no beliefs influential to healthcare decision-making identified during this visit  Family/Friends No family/friends present    Community:  Patient feels well-supported. Support system includes: Spouse/Partner  Family/Friends No family/friends present    Assessment and Plan of Care:     Patient Interventions include: Facilitated expression of thoughts and feelings  Family/Friends Interventions include: No family/friends present    Patient Plan of Care: Spiritual Care available upon further referral  Family/Friends Plan of Care: Spiritual Care available upon further referral    Electronically signed by Chaplain Hansa on 3/27/2025 at 10:33 AM   
Thank you [facility]  Office: [phone]  Jorge Loredo DO, Magnus Rivers, DO, Bubba Graham DO, Hector Butler DO, Joey Espitia MD, Loraine Torres MD, Natalie Uriarte MD, Estrellita Rice MD,  James Wilhelm MD, Maddie Mccloud MD, Ryan Hu MD,  Zelda Armenta DO, Beverley Ardon MD, Asa Rocha MD, Sravan Loredo DO, Elizabeth Alegre MD,  Rik Shah DO, Navya Thompson MD, Moni Riley MD, Eve Sue MD,  David Edwards MD, Evan Giron MD, Aidee Deleon MD, Cherise Vallejo MD, Godwin Chung MD, Reggie Padgett MD, Vignesh Tran DO, Jarod Padron MD, Zelda Welch MD, Mohsin Reza, MD, Shirley Waterhouse, CNP,  Mary Dumont, CNP, Vignesh Cooper, CNP,  Nuha Wong, DNP, Ana María Anne, CNP, Angy Carrillo, CNP, Danyelle Storey, CNP, Cristina Florentino, CNP, ROSETTE Pina-C, Shilpa Munoz, CNP, Angel Valdivia, CNP,  Emmy Alonso, CNP, Tara Toro, CNP, Olga Bashir, CNP,  Avis Fall, CNS, Krystina Pradhan, CNP, Rylie Leger CNP,   Fiorella Chance, CNP         [facility]   IN-PATIENT SERVICE   [facility]    Progress Note    3/31/2025    1:46 PM    Name:   Grupo Pérez  MRN:     [id number]     Acct:      [id number]   Room:   [address] [place] [address] Day:  6  Admit Date:  3/25/2025 12:42 PM    PCP:   Roger Mcmullen MD  Code Status:  Full Code    Subjective:     C/C:   Chief Complaint   Patient presents with    Abnormal Lab     Interval History Status: .    generalized fatigue and exertional dyspnea is improving  Denies chest pain/cough/palpitation  Still mildly confused off-and-on  Creatinine has improved to 1.2, has Hidalgo's catheter in place for obstructive uropathy, urology ordered postvoid residual  Hemoglobin 8.3, platelets 105  He had EGD showing Schatzki's ring, small hiatal hernia and moderate pangastritis and moderate duodenitis, GI recommended IV PPIs  Colonoscopy shows hypertrophied anal papula and internal hemorrhoids, no blood seen 
Thank you [facility]  Office: [phone]  Jorge Loredo DO, Magnus Rivers, DO, Bubba Graham DO, Hector Butler DO, Joey Espitia MD, Loraine Torres MD, Natalie Uriarte MD, Estrellita Rice MD,  James Wilhelm MD, Maddie Mccloud MD, Ryan Hu MD,  Zelda Armenta DO, Beverley Ardon MD, Asa Rocha MD, Sravan Loredo DO, Elizabeth Alegre MD,  Rik Shah DO, Navya Thompson MD, Moni Riley MD, Eev Sue MD,  David Edwards MD, Evan Giron MD, Aidee Deleon MD, Cherise Vallejo MD, Godwin Chung MD, Reggie Padgett MD, Vignesh Tran DO, Jarod Padron MD, Zelda Welch MD, Mohsin Reza, MD, Shirley Waterhouse, CNP,  Mary Dumont, CNP, Vignesh Cooper, CNP,  Nuha Wong, DNP, Ana María Anne, CNP, Angy Carrillo, CNP, Danyelle Storey, CNP, Cristina Florentino, CNP, ROSETTE Pina-C, Shilpa Munoz, CNP, Angel Valdivia, CNP,  Emmy Alonso, CNP, Tara Toro, CNP, Olga Bashir, CNP,  Avis Fall, CNS, Krystina Pradhan, CNP, Rylie Leger CNP,   Fiorella Chance, CNP         [facility]   IN-PATIENT SERVICE   [facility]    Progress Note    3/30/2025    2:03 PM    Name:   Grupo Pérez  MRN:     [id number]     Acct:      [id number]   Room:   [address] [place] [address] Day:  5  Admit Date:  3/25/2025 12:42 PM    PCP:   Roger Mcmullen MD  Code Status:  Full Code    Subjective:     C/C:   Chief Complaint   Patient presents with    Abnormal Lab     Interval History Status: .    generalized fatigue and exertional dyspnea is improving  Denies chest pain/cough/palpitation  Still mildly confused off-and-on  Creatinine has improved to 1.2, has Hidalgo's catheter in place for obstructive uropathy  Hemoglobin 8.3, platelets 105  He had EGD showing Schatzki's ring, small hiatal hernia and moderate pangastritis and moderate duodenitis, GI recommended IV PPIs  Colonoscopy shows hypertrophied anal papula and internal hemorrhoids, no blood seen in colon or terminal ileum  Brief 
Thank you [facility]  Office: [phone]  Jorge Loredo DO, Magnus Rivers, DO, Bubba Graham DO, Hector Butler DO, Joey Espitia MD, Loraine Torres MD, Natalie Uriarte MD, Estrellita Rice MD,  James Wilhelm MD, Maddie Mccloud MD, Ryan Hu MD,  Zelda Armenta DO, Beverley Ardon MD, Asa Rocha MD, Sravan Loredo DO, Elizabeth Alegre MD,  Rik Shah DO, Navya Thompson MD, Moni Riley MD, Eve Sue MD,  David Edwards MD, Evan Giron MD, iAdee Deleon MD, Cherise Vallejo MD, Gowdin Chung MD, Reggie Padgett MD, Vignesh Tran DO, Jarod Padron MD, Zelda Welch MD, Mohsin Reza, MD, Shirley Waterhouse, CNP,  Mary Dumont, CNP, Vignesh Cooper, CNP,  Nuha Wong, DNP, Ana María Anne, CNP, Angy Carrillo, CNP, Danyelle Sotrey, CNP, Cristina Florentino, CNP, ROSETTE Pina-C, Shilpa Munoz, CNP, Angel Valdivia, CNP,  Emmy Alonso, CNP, Tara Toro, CNP, Olga Bashir, CNP,  Avis Fall, CNS, Krystina Pradhan, CNP, Rylie Leger CNP,   Fiorella Chance, CNP         [facility]   IN-PATIENT SERVICE   [facility]    Progress Note    3/29/2025    1:47 PM    Name:   Grupo Pérez  MRN:     [id number]     Acct:      [id number]   Room:   [address] [place] [address] Day:  4  Admit Date:  3/25/2025 12:42 PM    PCP:   Roger Mcmullen MD  Code Status:  Full Code    Subjective:     C/C:   Chief Complaint   Patient presents with    Abnormal Lab     Interval History Status: .    generalized fatigue and exertional dyspnea is improving  Denies chest pain/cough/palpitation  Still mildly confused on waking up  Creatinine has improved to 1.3, has Hidalgo's catheter in place for obstructive uropathy  Hemoglobin 7.5, platelets 101  He will get EGD showing Schatzki's ring, small hiatal hernia and moderate pangastritis and moderate duodenitis, GI recommended IV PPIs  Colonoscopy shows hypertrophied anal papula and internal hemorrhoids, no blood seen in colon or terminal 
  Subjective  Subjective: Patient had just laid back down in bed and only agreeable for exercises and requested assistance repositioning in bed. RN Arianna had reported patient medically appropriate for PT treatment.    Objective  Vitals     Bed Mobility Training  Bed Mobility Training: Yes  Overall Level of Assistance: Contact guard assistance  Interventions: Safety awareness training;Tactile cues  Rolling: Contact guard assistance     PT Exercises  A/AROM Exercises: Supine nayana LE AROM x 10 reps with limited endurance and required rest breaks VCs for pursed lip breathing  Functional Mobility Circuit Training: rolled Lt<>Rt x 3 reps for repositioning     Safety Devices  Type of Devices: Call light within reach;Gait belt;Nurse notified;Heels elevated for pressure relief;All fall risk precautions in place;Bed alarm in place;Left in bed  Restraints  Restraints Initially in Place: No    Goals  Short Term Goals  Time Frame for Short Term Goals: 12 goals  Short Term Goal 1: Patient will demonstrate bed mobility with SBA  Short Term Goal 2: Patient will verbalize and demonstrate follow through of pressure relief techniques in order to promote healing and maintain good skin integrity  Short Term Goal 3: Patient will verbalize and demonstrate follow through of energy conservation techniques including compliance with incentive spirometer in order to improve activity tolerance  Short Term Goal 4: Patient will demonstrate funcitonal transfers using AD with CGA  Short Term Goal 5: Patient will ambulate >50' using AD with CGA  Additional Goals?: Yes  Short Term Goal 6: Patient will be indep with HEP for B LE  Patient Goals   Patient Goals : to feel better    Education  Patient Education  Education Given To: Patient  Education Provided: Home Exercise Program;Energy Conservation  Education Provided Comments: Rolling, repositiion, supine HEP  Education Method: Verbal  Education Outcome: Verbalized understanding;Continued education 
daily   spironolactone (ALDACTONE) 25 MG tablet Take 0.5 tablets by mouth daily   tamsulosin (FLOMAX) 0.4 MG capsule Take 1 capsule by mouth   simvastatin (ZOCOR) 20 MG tablet take 1 tablet by mouth at bedtime   timolol (TIMOPTIC) 0.5 % ophthalmic solution Place 1 drop into both eyes daily   bumetanide (BUMEX) 1 MG tablet Take 1 tablet by mouth 2 times daily  Patient not taking: Reported on 3/25/2025               
supple, symmetrical, trachea midline and thyroid not enlarged, symmetric, no tenderness/mass/nodules  Lungs: clear to auscultation bilaterally  Heart: Irregular rate and rhythm, S1, S2 normal, no murmur, click, rub or gallop  Abdomen: soft, non-tender; bowel sounds normal; no masses,  no organomegaly  Extremities: extremities normal, atraumatic, no cyanosis or edema  Neurologic: Mental status: Alert, oriented, thought content appropriate      Echo 3/2025    Left Ventricle: Normal left ventricular systolic function with a visually estimated EF of 60 - 65%. EF by 2D Simpsons Biplane is 64%. Left ventricle size is normal. Increased wall thickness. Findings consistent with mild concentric hypertrophy. Normal wall motion.    Right Ventricle: Right ventricle is moderately dilated.    Aortic Valve: 29 mm Edward sapein S3 Valve. Mild to moderate paravalvular regurgitation with an eccentrically directed jet. Mean Gradient of 5 mm Hg.    Mitral Valve: Mildly thickened leaflets. Mild regurgitation.    Tricuspid Valve: Severe regurgitation. Moderately elevated RVSP, consistent with moderate pulmonary hypertension. The estimated RVSP is 57 mmHg.    Left Atrium: Left atrium is severely dilated.    Right Atrium: Right atrium is severely dilated.    Aorta: Mildly dilated aortic root. Ao root diameter is 3.9 cm. Mildly dilated ascending aorta. Ao ascending diameter is 3.9 cm.    Image quality is adequate.          Assessment / Acute Cardiac Problems:   Chronic A-fib  AS- S/P TAVR  Decompensated heart failure  Bradycardia improved off metoprolol  Anemia with gastritis; no active bleeding found on EGD         Plan of Treatment:     Elquis and ASA on hold d/t anemia; will restart AM 3/30 if hemoglobin remains stable  Volume management diuresis per nephrology  Will continue to monitor.       Electronically signed by Bowen Antoine MD on 3/30/2025 at 6:53 AM  [facility]  [phone]      
  Ejection fraction: 60%  Stress Test: not obtained.  Cardiac Angiography: obtained and reviewed.     Echo 3/2025    Left Ventricle: Normal left ventricular systolic function with a visually estimated EF of 60 - 65%. EF by 2D Simpsons Biplane is 64%. Left ventricle size is normal. Increased wall thickness. Findings consistent with mild concentric hypertrophy. Normal wall motion.    Right Ventricle: Right ventricle is moderately dilated.    Aortic Valve: 29 mm Edward sapein S3 Valve. Mild to moderate paravalvular regurgitation with an eccentrically directed jet. Mean Gradient of 5 mm Hg.    Mitral Valve: Mildly thickened leaflets. Mild regurgitation.    Tricuspid Valve: Severe regurgitation. Moderately elevated RVSP, consistent with moderate pulmonary hypertension. The estimated RVSP is 57 mmHg.    Left Atrium: Left atrium is severely dilated.    Right Atrium: Right atrium is severely dilated.    Aorta: Mildly dilated aortic root. Ao root diameter is 3.9 cm. Mildly dilated ascending aorta. Ao ascending diameter is 3.9 cm.    Image quality is adequate.     Echo 8/2024    Left Ventricle: Moderately reduced left ventricular systolic function with a visually estimated EF of 40 - 45%. EF by 2D Simpsons Biplane is 40%. Left ventricle size is normal. Mildly increased wall thickness. Moderate global hypokinesis present. Abnormal diastolic function.    Aortic Valve: Trileaflet valve. Severely calcified cusps. Mild to moderate regurgitation. Severe stenosis of the aortic valve. AV mean gradient is 21 mmHg. AV Velocity Ratio is 0.26. AV area by continuity VTI is 1.2 cm2.    Mitral Valve: Severe annular calcification. Moderately calcified, at the anterior and posterior leaflets. Moderate regurgitation. At least mild stenosis noted by visual estimation.    Tricuspid Valve: Not well visualized. Severe annular dilation. Moderate regurgitation. The estimated RVSP is 41 mmHg.    Left Atrium: Left atrium is severely dilated. Left 
1 patch TransDERmal Daily    [Held by provider] aspirin  81 mg Oral Daily    atorvastatin  10 mg Oral Daily    timolol  1 drop Both Eyes Daily    tamsulosin  0.4 mg Oral Daily    sodium chloride flush  5-40 mL IntraVENous 2 times per day     Continuous Infusions:    dextrose 5 % and 0.9 % NaCl 50 mL/hr at 25 1548    sodium chloride      sodium chloride       PRN Meds: melatonin, sodium chloride, morphine, oxyCODONE-acetaminophen, methocarbamol, sodium chloride flush, sodium chloride, ondansetron **OR** ondansetron, polyethylene glycol, acetaminophen **OR** acetaminophen, potassium chloride **OR** potassium alternative oral replacement **OR** potassium chloride, magnesium sulfate    Data:     Past Medical History:   has a past medical history of Acute decompensated heart failure (HCC), Arterial thrombosis (HCC), Arthritis, Blood circulation, collateral, Bradycardia, CAD (coronary artery disease), Former smoker, GERD (gastroesophageal reflux disease), History of cardiac cath, Hyperlipidemia, Hypertension, and S/P transesophageal echocardiogram ([facility]).    Social History:   reports that he quit smoking about 55 years ago. His smoking use included cigarettes. He uses smokeless tobacco. He reports that he does not currently use alcohol. He reports that he does not use drugs.     Family History:   Family History   Problem Relation Age of Onset    High Blood Pressure Mother     Cancer Father     Other Brother        Vitals:  BP (!) 114/55   Pulse 57   Temp 97.9 °F (36.6 °C)   Resp 13   Ht 1.803 m (5' 11\")   Wt 86.7 kg (191 lb 3.2 oz)   SpO2 95%   BMI 26.67 kg/m²   Temp (24hrs), Av °F (36.7 °C), Min:97.7 °F (36.5 °C), Max:98.2 °F (36.8 °C)    No results for input(s): \"POCGLU\" in the last 72 hours.    I/O (24Hr):    Intake/Output Summary (Last 24 hours) at 3/28/2025 1556  Last data filed at 3/28/2025 1542  Gross per 24 hour   Intake 960 ml   Output 2675 ml   Net -1715 ml       Labs:  Hematology:  Recent Labs 
Stand by assist   Neuromuscular Education  Neuromuscular education: Yes  NDT Treatment: Gait ;Upper extremity;Sitting;Standing  OT Exercises  Exercise Treatment: Pt completed B UE AROM exercises seated in chair to increase STR/ROM and endurance for increased ease with all functional tasks. Pt completed 20 reps of shoulder shrugs, grasp/release, wrist flex/ext, sup/pro, elb flex/ext and shoulder flex to 90 degress on left only due to limited right shoulder AROM. Pt tolerated fair and req'd verbal/visual/tactile cues for technique and to count reps.     Assessment  Assessment  Assessment: Pt tolerated session fair and is slowly progressing towards goals. Pt completed functional transfers, STSs and functional mobility using RW with SBA/CGA for safety. Pt completed B UE AROM exercises and writer assessed cognition (see cog section). Pt has poor insight into deficits, decreased activity tolerance and safety awareness. Pt will require A SIGNIFICANT amout of assist with self care tasks. Pt anxious to get home with wife. Cont with OT POC to ensure a safe return to PLOF as able.  Activity Tolerance: Patient limited by fatigue;Patient limited by endurance  Discharge Recommendations: Patient would benefit from continued therapy after discharge  Safety Devices  Safety Devices in place: Yes  Type of devices: All fall risk precautions in place;Left in chair;Nurse notified;Call light within reach;Chair alarm in place;Gait belt;Patient at risk for falls    Patient Education  Education  Education Given To: Patient  Education Provided: Role of Therapy;Mobility Training;Fall Prevention Strategies;Plan of Care;Transfer Training;Energy Conservation;Home Exercise Program;Precautions;Cognition;Safety;Equipment  Education Method: Demonstration;Verbal;Teach Back;Printed Information/Hand-outs  Barriers to Learning: Cognition  Education Outcome: Verbalized understanding;Demonstrated understanding;Continued education needed  Access Code: 
 --   --  4.7   RBC 2.19*  --   --   --  2.36*   HGB 7.3*   < > 7.4* 7.9* 7.7*   HCT 21.5*   < > 22.7* 24.4* 23.8*   MCV 98.2  --   --   --  100.8   MCH 33.4  --   --   --  32.6   MCHC 34.0  --   --   --  32.4   RDW 19.9*  --   --   --  20.7*   *  --   --   --  112*   MPV 7.6  --   --   --  10.3   INR 1.1  --   --   --   --     < > = values in this interval not displayed.     Chemistry:  Recent Labs     03/25/25  1321 03/25/25  1504 03/26/25  0443 03/27/25  0418     --  137 139   K 4.6  --  4.9 4.8     --  106 106   CO2 22  --  19* 19*   GLUCOSE 107*  --  107 106   BUN 83*  --  78* 82*   CREATININE 1.9*  --  1.9* 2.3*   MG  --   --  2.6* 2.7*   ANIONGAP 11  --  12 14   LABGLOM 35*  --  35* 28*   CALCIUM 8.9  --  8.4* 8.5*   PROBNP 4,494*  --   --  4,552*   TROPHS 73* 68*  --   --      Recent Labs     03/25/25  1321 03/25/25  1820 03/26/25  0443   TSH 3.24 2.65  --    AST 37  --  31   ALT 11  --  9*   ALKPHOS 61  --  58   BILITOT 0.9  --  0.8   BILIDIR  --   --  0.4*   CHOL  --   --  98   HDL  --   --  48   CHOLHDLRATIO  --   --  2.0   TRIG  --   --  47   VLDL  --   --  9     ABG:No results found for: \"POCPH\", \"PHART\", \"PH\", \"POCPCO2\", \"RKZ1PKR\", \"PCO2\", \"POCPO2\", \"PO2ART\", \"PO2\", \"POCHCO3\", \"PEM6DZE\", \"HCO3\", \"NBEA\", \"PBEA\", \"BEART\", \"BE\", \"THGBART\", \"THB\", \"SQF1UKR\", \"DIGV8BVD\", \"G1JRDAMC\", \"O2SAT\", \"FIO2\"  No results found for: \"SPECIAL\"  No results found for: \"CULTURE\"    Radiology:  XR CHEST PORTABLE  Result Date: 3/25/2025  1. Small bilateral pleural effusions. 2. Prominent cardiomegaly.       Physical Examination:        General appearance:  alert, cooperative and no distress  Mental Status:  oriented to person, place and time and normal affect  Lungs: Chest emphysematous, diminished breath sounds at bases   Heart: Irregular rhythm, + murmur in parasternal area  Abdomen:  soft, nontender, nondistended, normal bowel sounds, no masses, hepatomegaly, splenomegaly  Extremities:  ++ edema, no 
Training;Energy Conservation;Home Exercise Program;Precautions;Safety;ADL Function  Education Method: Demonstration;Verbal  Barriers to Learning: Cognition  Education Outcome: Verbalized understanding;Demonstrated understanding;Continued education needed    Plan  Occupational Therapy Plan  Times Per Week: 4-5x/week 1x/day as tolerated  Current Treatment Recommendations: Strengthening;Functional mobility training;Balance training;Endurance training;Safety education & training;Patient/Caregiver education & training;Self-Care / ADL;Positioning;Equipment evaluation, education, & procurement    Goals  Patient Goals   Patient goals : To get stronger, go home  Short Term Goals  Time Frame for Short Term Goals: By discharge, pt to demo:  Short Term Goal 1: bed mobility tasks to CGA with Good safety and use of bedrails as needed. (Met)  Short Term Goal 2: ADL transfers and functional mobility tasks to SUP/MOF I with Good safety and use of RW. (goal updated per GM, OTR)  Short Term Goal 3: simple hygiene/grooming tasks to SBA, following set up at the appropriate level, to increase IND with UB ADLs.  Short Term Goal 4: UB ADLs to SBA and LB ADLs to ModAx1 with Good safety and use of AD/AE/compensatory strategies as needed.  Short Term Goal 5: Pt/family to be IND with pressure relief tech, fall prevention strategies, EC/WS tech, condition specific education, potential equipment/discharge recommendations and a BUE HEP with use of handouts as needed.    AM-PAC Score        AM-[facility] Inpatient Daily Activity Raw Score: 17 (03/31/25 0849)  AM-PAC Inpatient ADL T-Scale Score : 37.26 (03/31/25 0849)  ADL Inpatient CMS 0-100% Score: 50.11 (03/31/25 0849)  ADL Inpatient CMS G-Code Modifier : CK (03/31/25 0849)      Therapy Time   Individual Concurrent Group Co-treatment   Time In 0822         Time Out 0903         Minutes 41                 ADELAIDA TERRY     
diuretics  2.  Keep Hidalgo in and discharge home with the Hidalgo unless urology recommends otherwise, decision about Hidalgo after urology  3.  Stable for discharge from nephrology standpoint  4.  Check BMP 1 week after discharge  5.  Outpatient follow-up with nephrology in 2 to 3 weeks  6.  Nothing else to add nephrology signing off please call for questions    Attending Physician Statement  I have discussed the care of Grupo Pérez, including pertinent history and exam findings with the resident/fellow. I have reviewed the key elements of all parts of the encounter with the resident/fellow. I have seen and examined the patient with the resident/fellow.  I agree with the assessment and plan and status of the problem list as documented.      .  Electronically signed by Sara Howell MD on 3/29/2025 at 4:47 PM    This note is created with the assistance of a speech-recognition program. While intending to generate a document that actually reflects the content of the visit, no guarantees can be provided that every mistake has been identified and corrected by editing.

## 2025-03-31 NOTE — CARE COORDINATION
Social work:  Spoke to Adwoa/CHANDNI CM states patient now considering SNF, but didn't confirm facility of choice.   Writer called spouse to further discuss, she wants patient to return home, feels she can handle care; discussed home care, considering this option.   She is coming up to [facility] shortly and will further discuss.     Update 14:29- Met with patient with spouse and dtr at bedside to confirm dc plans- plan is home with [facility].   Asking for 2 wheeled walker, RN to get order.

## 2025-03-31 NOTE — PLAN OF CARE
Problem: Discharge Planning  Goal: Discharge to home or other facility with appropriate resources  3/31/2025 0614 by Ralu Arana RN  Outcome: Progressing  3/30/2025 1846 by Adalid Driscoll RN  Outcome: Progressing  Flowsheets (Taken 3/30/2025 0800)  Discharge to home or other facility with appropriate resources: Identify barriers to discharge with patient and caregiver     Problem: Pain  Goal: Verbalizes/displays adequate comfort level or baseline comfort level  3/31/2025 0614 by Raul Arana RN  Outcome: Progressing  3/30/2025 1846 by Adalid Driscoll RN  Outcome: Progressing     Problem: ABCDS Injury Assessment  Goal: Absence of physical injury  3/31/2025 0614 by Raul Arana RN  Outcome: Progressing  3/30/2025 1846 by Adalid Driscoll RN  Outcome: Progressing     Problem: Safety - Adult  Goal: Free from fall injury  3/31/2025 0614 by Raul Arana RN  Outcome: Progressing  3/30/2025 1846 by Adalid Driscoll RN  Outcome: Progressing

## 2025-03-31 NOTE — DISCHARGE INSTR - COC
Continuity of Care Form    Patient Name: Grupo Pérez   :  1940  MRN:  [id number]    Admit date:  3/25/2025  Discharge date:  3/31/25    Code Status Order: Full Code   Advance Directives:     Admitting Physician:  Joey Espitia MD  PCP: Roger Mcmullen MD    Discharging Nurse: Kinga Quintanilla  Discharging Hospital Unit/Room#: 1104/1104-01  Discharging Unit Phone Number: [phone]    Emergency Contact:   Extended Emergency Contact Information  Primary Emergency Contact: Oksana Pérez  Address: [address] [place] [address]  Home Phone: [phone]  Relation: Spouse  Secondary Emergency Contact: Christine Hernandez  Mobile Phone: [phone]  Relation: Child    Past Surgical History:  Past Surgical History:   Procedure Laterality Date    CARDIAC PROCEDURE N/A 2025    taleb / estevan/Left and right heart cath / coronary angiography performed by Vidhi Burns MD at [facility] CARDIAC CATH LAB    CARDIAC PROCEDURE N/A 2025    Estevan during cath case performed by Vidhi Burns MD at [facility] CARDIAC CATH LAB    CARDIAC PROCEDURE N/A 3/4/2025    taleb / Transcatheter aortic valve replacement performed by Vidhi Burns MD at [facility]    CARDIAC SURGERY N/A 3/4/2025    TRANSCATHETER AORTIC VALVE REPLACEMENT FEMORAL APPROACH performed by Vignesh Hogan MD at [facility]    COLONOSCOPY      COLONOSCOPY N/A 3/28/2025    COLONOSCOPY DIAGNOSTIC performed by Sravan Bennett MD at [facility]    ENDOSCOPY, COLON, DIAGNOSTIC      HERNIA REPAIR      INVASIVE VASCULAR N/A 2025    Ultrasound guided vascular access performed by Vidhi Burns MD at [facility] CARDIAC CATH LAB    JOINT REPLACEMENT      JOINT REPLACEMENT  2009    NEUROVASCULAR PROCEDURE N/A 3/4/2025    Ultrasound guided vascular access (77098) performed by Vidhi Burns MD at [facility] CVOR    THROMBECTOMY Right 2013    RLdr Erick CHOUDHARY; INR was subtherapeutic following shoulder surgery    TONSILLECTOMY      TOTAL HIP ARTHROPLASTY

## 2025-04-03 ENCOUNTER — HOSPITAL ENCOUNTER (EMERGENCY)
Age: 85
Discharge: HOME | End: 2025-04-03
Attending: EMERGENCY MEDICINE
Payer: COMMERCIAL

## 2025-04-03 VITALS
HEART RATE: 66 BPM | DIASTOLIC BLOOD PRESSURE: 24 MMHG | WEIGHT: 191 LBS | RESPIRATION RATE: 18 BRPM | BODY MASS INDEX: 26.64 KG/M2 | TEMPERATURE: 98.3 F | SYSTOLIC BLOOD PRESSURE: 121 MMHG | OXYGEN SATURATION: 98 %

## 2025-04-03 DIAGNOSIS — R33.9 URINARY RETENTION: Primary | ICD-10-CM

## 2025-04-03 PROCEDURE — 51702 INSERT TEMP BLADDER CATH: CPT

## 2025-04-03 PROCEDURE — 6370000000 HC RX 637 (ALT 250 FOR IP): Performed by: EMERGENCY MEDICINE

## 2025-04-03 PROCEDURE — 99283 EMERGENCY DEPT VISIT LOW MDM: CPT

## 2025-04-03 PROCEDURE — 51798 US URINE CAPACITY MEASURE: CPT

## 2025-04-03 RX ORDER — LIDOCAINE HYDROCHLORIDE 20 MG/ML
5 JELLY TOPICAL PRN
Status: DISCONTINUED | OUTPATIENT
Start: 2025-04-03 | End: 2025-04-03 | Stop reason: HOSPADM

## 2025-04-03 RX ADMIN — LIDOCAINE HYDROCHLORIDE 5 ML: 20 JELLY TOPICAL at 01:00

## 2025-04-03 NOTE — ED PROVIDER NOTES
[facility] EMERGENCY DEPARTMENT  eMERGENCY dEPARTMENT eNCOUnter    Pt Name: Grupo Pérez  MRN: [id number]  Birthdate 1940  Date of evaluation: 4/3/25  CHIEF COMPLAINT       Chief Complaint   Patient presents with    Urinary Retention     Had fajardo out at 1330 yesterday      HISTORY OF PRESENT ILLNESS   HPI  Patient is an 84-year-old male who had his Fajardo catheter removed by Dr. Clarke today in the office at 1:30 PM.  Since then patient has been unable to urinate.  Patient has no other complaints other than some constipation.  REVIEW OF SYSTEMS     Constitutional: No fever  Eye: No visual changes  Ear/Nose/Mouth/Throat: No sore throat  Respiratory: No shortness of breath  Cardiovascular: No chest pain  Gastrointestinal: No nausea, no vomiting, no diarrhea  Genitourinary: No dysuria, as above  Musculoskeletal: No joint pain  Integumentary: No rash  Neurologic: No dizziness  Psychiatric: No anxiety, no depression  All systems otherwise negative.        PAST MEDICAL HISTORY     Past Medical History:   Diagnosis Date    Acute 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    Arthritis     Blood circulation, collateral     Bradycardia     CAD (coronary artery disease)     Former smoker     GERD (gastroesophageal reflux disease)     History of cardiac cath     Hyperlipidemia     Hypertension     S/P transesophageal echocardiogram (ESTEVAN)      SURGICAL HISTORY       Past Surgical History:   Procedure Laterality Date    CARDIAC PROCEDURE N/A 02/05/2025    talruth ann / estevan/Left and right heart cath / coronary angiography performed by Vidhi Burns MD at [facility] CARDIAC CATH LAB    CARDIAC PROCEDURE N/A 02/05/2025    Estevan during cath case performed by Vidhi Burns MD at [facility] CARDIAC CATH LAB    CARDIAC PROCEDURE N/A 3/4/2025    jerome / Transcatheter aortic valve replacement performed by Vidhi Burns MD at [facility] CVOR    CARDIAC SURGERY N/A

## 2025-04-03 NOTE — ED NOTES
Pt arrived to ed with c/o urinary retention. Pt family states his Hidalgo was removed today at 1300 and pt hasn't been able to urinate since. Pt not complaining of any pain. Pt family states he has a Cysto with Dr. Negro today. Respirations non labored. Skin warm and dry. Pt resting with Family at bedside. Will continue to monitor.

## 2025-04-28 ENCOUNTER — HOSPITAL ENCOUNTER (OUTPATIENT)
Age: 85
Setting detail: SPECIMEN
Discharge: HOME | End: 2025-04-28

## 2025-04-28 DIAGNOSIS — N17.9 ACUTE KIDNEY INJURY: ICD-10-CM

## 2025-04-28 LAB
ANION GAP SERPL CALCULATED.3IONS-SCNC: 10 MMOL/L (ref 9–16)
BUN SERPL-MCNC: 35 MG/DL (ref 8–23)
CALCIUM SERPL-MCNC: 8.5 MG/DL (ref 8.6–10.4)
CHLORIDE SERPL-SCNC: 106 MMOL/L (ref 98–107)
CO2 SERPL-SCNC: 25 MMOL/L (ref 20–31)
CREAT SERPL-MCNC: 1.3 MG/DL (ref 0.7–1.2)
GFR, ESTIMATED: 55 ML/MIN/1.73M2
GLUCOSE SERPL-MCNC: 104 MG/DL (ref 74–99)
POTASSIUM SERPL-SCNC: 4.4 MMOL/L (ref 3.7–5.3)
SODIUM SERPL-SCNC: 141 MMOL/L (ref 136–145)

## 2025-05-12 ENCOUNTER — HOSPITAL ENCOUNTER (OUTPATIENT)
Age: 85
Setting detail: SPECIMEN
Discharge: HOME | End: 2025-05-12

## 2025-05-12 DIAGNOSIS — N17.9 ACUTE KIDNEY INJURY: ICD-10-CM

## 2025-05-12 LAB
ANION GAP SERPL CALCULATED.3IONS-SCNC: 12 MMOL/L (ref 9–16)
BUN SERPL-MCNC: 33 MG/DL (ref 8–23)
CALCIUM SERPL-MCNC: 9.1 MG/DL (ref 8.6–10.4)
CHLORIDE SERPL-SCNC: 102 MMOL/L (ref 98–107)
CO2 SERPL-SCNC: 27 MMOL/L (ref 20–31)
CREAT SERPL-MCNC: 1.4 MG/DL (ref 0.7–1.2)
GFR, ESTIMATED: 50 ML/MIN/1.73M2
GLUCOSE SERPL-MCNC: 95 MG/DL (ref 74–99)
POTASSIUM SERPL-SCNC: 4.4 MMOL/L (ref 3.7–5.3)
SODIUM SERPL-SCNC: 141 MMOL/L (ref 136–145)

## 2025-05-14 ENCOUNTER — FOLLOWUP TELEPHONE ENCOUNTER (OUTPATIENT)
Age: 85
End: 2025-05-14

## 2025-05-14 NOTE — TELEPHONE ENCOUNTER
RN called pt for 30 day S/P TAVR follow-up [facility] (results in Media). NYHA Classification is II.  Pt reminded to call PCP for antibiotic order prior to any dental work.  Pt verbalized understanding.

## 2025-06-10 ENCOUNTER — HOSPITAL ENCOUNTER (OUTPATIENT)
Age: 85
Setting detail: SPECIMEN
Discharge: HOME | End: 2025-06-10

## 2025-06-10 DIAGNOSIS — N18.32 STAGE 3B CHRONIC KIDNEY DISEASE (HCC): ICD-10-CM

## 2025-06-10 LAB
ANION GAP SERPL CALCULATED.3IONS-SCNC: 10 MMOL/L (ref 9–16)
BUN SERPL-MCNC: 21 MG/DL (ref 8–23)
CALCIUM SERPL-MCNC: 8.4 MG/DL (ref 8.6–10.4)
CHLORIDE SERPL-SCNC: 101 MMOL/L (ref 98–107)
CO2 SERPL-SCNC: 28 MMOL/L (ref 20–31)
CREAT SERPL-MCNC: 1.1 MG/DL (ref 0.7–1.2)
GFR, ESTIMATED: 67 ML/MIN/1.73M2
GLUCOSE SERPL-MCNC: 87 MG/DL (ref 74–99)
POTASSIUM SERPL-SCNC: 3.9 MMOL/L (ref 3.7–5.3)
SODIUM SERPL-SCNC: 139 MMOL/L (ref 136–145)

## (undated) DEVICE — CATHETER ANGIO 6FR L100CM DIA0.056IN AL1 CRV VASC ACCS EXPO

## (undated) DEVICE — TRAY SURG CUST CRD CATH TOLEDO

## (undated) DEVICE — CATHETER PACE 5FR L110CM 6FR INTRO D10CM 1MM SPACE POLYUR

## (undated) DEVICE — CATHETER PRESSURE WEDGE BLLN 6FRX110CM

## (undated) DEVICE — SOLUTION IV 0.9% NACL IRRIGATION 3000ML BAG

## (undated) DEVICE — DILATOR: Brand: COOK

## (undated) DEVICE — SAPIEN 3 ULTRA RESILIA TRANSCATHETER HEART VALVE, 29MM
Type: IMPLANTABLE DEVICE | Status: NON-FUNCTIONAL
Brand: SAPIEN 3 ULTRA RESILIA

## (undated) DEVICE — GOWN,SIRUS,NONRNF,SETINSLV,XL,20/CS: Brand: MEDLINE

## (undated) DEVICE — Device

## (undated) DEVICE — STAZ ENDO KIT: Brand: MEDLINE INDUSTRIES, INC.

## (undated) DEVICE — BAG C ARM 22 IN LEN 22 IN W LTX FREE ST SNAP

## (undated) DEVICE — GUIDEWIRE 35/260/FC/PTFE/3J: Brand: GUIDEWIRE

## (undated) DEVICE — KIT MICRO INTRO 4FR STIFF 40CM NIGHTENALL TUNGSTEN 7SMT

## (undated) DEVICE — DRAPE,REIN 53X77,STERILE: Brand: MEDLINE

## (undated) DEVICE — GUIDEWIRE VASC L260CM DIA0.035IN BRECKER FOR COREVLV

## (undated) DEVICE — GUIDEWIRE VASC L260CM DIA0.035IN STR TIP L7CM PTFE FIX COR

## (undated) DEVICE — SURGICAL PROCEDURE TRAY DRSG CHNG DISP

## (undated) DEVICE — STRAP ARMBRD W1.5XL32IN FOAM STR YET SFT W/ HK AND LOOP

## (undated) DEVICE — DEVICE CLOSURE VASCULAR MANTA 18FR

## (undated) DEVICE — SYRINGE MED 10ML LUERLOCK TIP W/O SFTY DISP

## (undated) DEVICE — GUIDEWIRE VASC L150CM DIA0.025IN TIP DIA3MM L7MM INTVASC S

## (undated) DEVICE — GAUZE,SPONGE,4"X4",16PLY,XRAY,STRL,LF: Brand: MEDLINE

## (undated) DEVICE — PROVE COVER: Brand: UNBRANDED

## (undated) DEVICE — GUIDEWIRE VASC L260CM DIA0.035IN L7CM DIA3MM J TIP PTFE S

## (undated) DEVICE — CATHETER PULM ART L110CM DIA6FR THRMDIL DBL LUMN FLO SWN GZ

## (undated) DEVICE — GLOVE SURG SZ 75 CRM LTX FREE POLYISOPRENE POLYMER BEAD ANTI

## (undated) DEVICE — INTRODUCER SHTH 6FR L11CM 0.038IN STD SIDEPRT EXTN 3 W

## (undated) DEVICE — BITE BLOCK ENDOSCP AD 60 FR W/ ADJ STRP PLAS GRN BLOX

## (undated) DEVICE — CATHETER ANGIO 5FR L110CM DIA0.047IN VENT PIG IMPLS

## (undated) DEVICE — GLOVE SURG SZ 7 CRM LTX FREE POLYISOPRENE POLYMER BEAD ANTI

## (undated) DEVICE — SUTURE PERMAHAND SZ 0 L30IN NONABSORBABLE BLK L26MM SH 1/2 K834H

## (undated) DEVICE — CATHETER DUAL LUMEN LANGSTON 6F L110CM GWIRE 0.038IN 1000PSI

## (undated) DEVICE — ANGIOGRAPHIC CATHETER: Brand: IMPULSE™

## (undated) DEVICE — GOWN,SIRUS,NONRNF,SETINSLV,2XL,18/CS: Brand: MEDLINE

## (undated) DEVICE — DRAPE,UTILITY,XL,4/PK,STERILE: Brand: MEDLINE

## (undated) DEVICE — INTRODUCER SHTH AD L12CM DIA6FR 0.032IN CV PERM VLV NO RADPQ

## (undated) DEVICE — GLOVE SURG SZ 7.5 L11.73IN FNGR THK9.8MIL STRW LTX POLYMER

## (undated) DEVICE — SHEET, ORTHO, SPLIT, STERILE: Brand: MEDLINE

## (undated) DEVICE — GLOVE SURG SZ 65 CRM LTX FREE POLYISOPRENE POLYMER BEAD ANTI

## (undated) DEVICE — CATHETER ANGIO PIG AD 6 FRX110 CM 7.5 CM PERFORMA

## (undated) DEVICE — SYRINGE MED 50ML LUERLOCK TIP

## (undated) DEVICE — INTRODUCER SHTH STD 8 FRX11 CM FLX KINK RESIST CANN AVNT +